# Patient Record
Sex: FEMALE | Race: WHITE | NOT HISPANIC OR LATINO
[De-identification: names, ages, dates, MRNs, and addresses within clinical notes are randomized per-mention and may not be internally consistent; named-entity substitution may affect disease eponyms.]

---

## 2018-05-22 PROBLEM — Z00.00 ENCOUNTER FOR PREVENTIVE HEALTH EXAMINATION: Status: ACTIVE | Noted: 2018-05-22

## 2018-05-31 ENCOUNTER — APPOINTMENT (OUTPATIENT)
Dept: PHYSICAL MEDICINE AND REHAB | Facility: CLINIC | Age: 38
End: 2018-05-31
Payer: COMMERCIAL

## 2018-05-31 VITALS — HEIGHT: 68 IN | BODY MASS INDEX: 20.46 KG/M2 | HEART RATE: 66 BPM | WEIGHT: 135 LBS

## 2018-05-31 PROCEDURE — 99204 OFFICE O/P NEW MOD 45 MIN: CPT

## 2018-05-31 RX ORDER — MELOXICAM 15 MG/1
15 TABLET ORAL
Qty: 30 | Refills: 1 | Status: ACTIVE | COMMUNITY
Start: 2018-05-31 | End: 1900-01-01

## 2018-05-31 RX ORDER — METAXALONE 800 MG/1
800 TABLET ORAL
Qty: 30 | Refills: 0 | Status: ACTIVE | COMMUNITY
Start: 2018-05-31 | End: 1900-01-01

## 2018-11-05 ENCOUNTER — APPOINTMENT (OUTPATIENT)
Dept: PHYSICAL MEDICINE AND REHAB | Facility: CLINIC | Age: 38
End: 2018-11-05
Payer: COMMERCIAL

## 2018-11-05 VITALS
WEIGHT: 135 LBS | RESPIRATION RATE: 16 BRPM | BODY MASS INDEX: 20.46 KG/M2 | HEIGHT: 68 IN | HEART RATE: 63 BPM | OXYGEN SATURATION: 98 %

## 2018-11-05 DIAGNOSIS — M54.5 LOW BACK PAIN: ICD-10-CM

## 2018-11-05 PROCEDURE — 99214 OFFICE O/P EST MOD 30 MIN: CPT

## 2018-11-20 ENCOUNTER — APPOINTMENT (OUTPATIENT)
Dept: PHYSICAL MEDICINE AND REHAB | Facility: CLINIC | Age: 38
End: 2018-11-20

## 2018-12-20 ENCOUNTER — APPOINTMENT (OUTPATIENT)
Dept: PHYSICAL MEDICINE AND REHAB | Facility: CLINIC | Age: 38
End: 2018-12-20

## 2019-11-12 ENCOUNTER — APPOINTMENT (OUTPATIENT)
Dept: HUMAN REPRODUCTION | Facility: CLINIC | Age: 39
End: 2019-11-12

## 2019-11-12 ENCOUNTER — APPOINTMENT (OUTPATIENT)
Dept: HUMAN REPRODUCTION | Facility: CLINIC | Age: 39
End: 2019-11-12
Payer: COMMERCIAL

## 2019-11-12 PROCEDURE — 99205 OFFICE O/P NEW HI 60 MIN: CPT | Mod: 25

## 2019-11-12 PROCEDURE — 76830 TRANSVAGINAL US NON-OB: CPT

## 2019-11-15 ENCOUNTER — OUTPATIENT (OUTPATIENT)
Dept: OUTPATIENT SERVICES | Facility: HOSPITAL | Age: 39
LOS: 1 days | End: 2019-11-15
Payer: COMMERCIAL

## 2019-11-15 ENCOUNTER — TRANSCRIPTION ENCOUNTER (OUTPATIENT)
Age: 39
End: 2019-11-15

## 2019-11-15 ENCOUNTER — APPOINTMENT (OUTPATIENT)
Dept: MRI IMAGING | Facility: CLINIC | Age: 39
End: 2019-11-15
Payer: COMMERCIAL

## 2019-11-15 DIAGNOSIS — Z00.8 ENCOUNTER FOR OTHER GENERAL EXAMINATION: ICD-10-CM

## 2019-11-15 PROCEDURE — 72197 MRI PELVIS W/O & W/DYE: CPT | Mod: 26

## 2019-11-15 PROCEDURE — A9585: CPT

## 2019-11-15 PROCEDURE — 72197 MRI PELVIS W/O & W/DYE: CPT

## 2019-12-05 ENCOUNTER — FORM ENCOUNTER (OUTPATIENT)
Age: 39
End: 2019-12-05

## 2019-12-06 ENCOUNTER — APPOINTMENT (OUTPATIENT)
Dept: OBGYN | Facility: CLINIC | Age: 39
End: 2019-12-06
Payer: COMMERCIAL

## 2019-12-06 PROCEDURE — 99204 OFFICE O/P NEW MOD 45 MIN: CPT

## 2019-12-10 ENCOUNTER — APPOINTMENT (OUTPATIENT)
Dept: HUMAN REPRODUCTION | Facility: CLINIC | Age: 39
End: 2019-12-10
Payer: COMMERCIAL

## 2019-12-10 PROCEDURE — 99214 OFFICE O/P EST MOD 30 MIN: CPT

## 2020-02-11 ENCOUNTER — OUTPATIENT (OUTPATIENT)
Dept: OUTPATIENT SERVICES | Facility: HOSPITAL | Age: 40
LOS: 1 days | End: 2020-02-11
Payer: COMMERCIAL

## 2020-02-11 VITALS
DIASTOLIC BLOOD PRESSURE: 80 MMHG | WEIGHT: 145.95 LBS | OXYGEN SATURATION: 98 % | RESPIRATION RATE: 16 BRPM | HEART RATE: 55 BPM | TEMPERATURE: 97 F | SYSTOLIC BLOOD PRESSURE: 120 MMHG | HEIGHT: 68 IN

## 2020-02-11 DIAGNOSIS — D25.9 LEIOMYOMA OF UTERUS, UNSPECIFIED: ICD-10-CM

## 2020-02-11 DIAGNOSIS — Z01.818 ENCOUNTER FOR OTHER PREPROCEDURAL EXAMINATION: ICD-10-CM

## 2020-02-11 DIAGNOSIS — Z98.890 OTHER SPECIFIED POSTPROCEDURAL STATES: Chronic | ICD-10-CM

## 2020-02-11 LAB
BLD GP AB SCN SERPL QL: NEGATIVE — SIGNIFICANT CHANGE UP
HCT VFR BLD CALC: 43.8 % — SIGNIFICANT CHANGE UP (ref 34.5–45)
HGB BLD-MCNC: 14.2 G/DL — SIGNIFICANT CHANGE UP (ref 11.5–15.5)
MCHC RBC-ENTMCNC: 28.5 PG — SIGNIFICANT CHANGE UP (ref 27–34)
MCHC RBC-ENTMCNC: 32.4 GM/DL — SIGNIFICANT CHANGE UP (ref 32–36)
MCV RBC AUTO: 88 FL — SIGNIFICANT CHANGE UP (ref 80–100)
NRBC # BLD: 0 /100 WBCS — SIGNIFICANT CHANGE UP (ref 0–0)
PLATELET # BLD AUTO: 193 K/UL — SIGNIFICANT CHANGE UP (ref 150–400)
RBC # BLD: 4.98 M/UL — SIGNIFICANT CHANGE UP (ref 3.8–5.2)
RBC # FLD: 12 % — SIGNIFICANT CHANGE UP (ref 10.3–14.5)
RH IG SCN BLD-IMP: NEGATIVE — SIGNIFICANT CHANGE UP
WBC # BLD: 4.16 K/UL — SIGNIFICANT CHANGE UP (ref 3.8–10.5)
WBC # FLD AUTO: 4.16 K/UL — SIGNIFICANT CHANGE UP (ref 3.8–10.5)

## 2020-02-11 PROCEDURE — 85027 COMPLETE CBC AUTOMATED: CPT

## 2020-02-11 PROCEDURE — 86901 BLOOD TYPING SEROLOGIC RH(D): CPT

## 2020-02-11 PROCEDURE — 86900 BLOOD TYPING SEROLOGIC ABO: CPT

## 2020-02-11 PROCEDURE — G0463: CPT

## 2020-02-11 PROCEDURE — 86850 RBC ANTIBODY SCREEN: CPT

## 2020-02-11 RX ORDER — CEFAZOLIN SODIUM 1 G
2000 VIAL (EA) INJECTION ONCE
Refills: 0 | Status: DISCONTINUED | OUTPATIENT
Start: 2020-02-21 | End: 2020-03-07

## 2020-02-11 NOTE — H&P PST ADULT - NSANTHOSAYNRD_GEN_A_CORE
No. GUY screening performed.  STOP BANG Legend: 0-2 = LOW Risk; 3-4 = INTERMEDIATE Risk; 5-8 = HIGH Risk

## 2020-02-11 NOTE — H&P PST ADULT - HISTORY OF PRESENT ILLNESS
39 year old female with hx of prolapse came in for PSt today for laparoscopic myomectomy ,possible exploratory laparotomy , Patient has hx of back pains and symptoms got worst recently , went to see gyn , evaluated and was positive for uterine fibroids. Referred to Dr Madrigal evaluated and now scheduled this procedure for treatment. 39 year old female with hx ofbenign mitral valve  prolapse came in for PSt today for laparoscopic myomectomy ,possible exploratory laparotomy , Patient has hx of back pains and symptoms got worst recently , went to see gyn , evaluated and was positive for uterine fibroids. Referred to Dr Madrigal evaluated and now scheduled this procedure for treatment.

## 2020-02-11 NOTE — H&P PST ADULT - NSICDXPROBLEM_GEN_ALL_CORE_FT
PROBLEM DIAGNOSES  Problem: Leiomyoma of uterus  Assessment and Plan: Laparoscopic myomectomy ,possible exploratory laparotomy ,CBC/type and screen  drawn sent pending result , urine pregnancy test on DOS , to stop supplements 5 days preop

## 2020-02-20 ENCOUNTER — TRANSCRIPTION ENCOUNTER (OUTPATIENT)
Age: 40
End: 2020-02-20

## 2020-02-21 ENCOUNTER — OUTPATIENT (OUTPATIENT)
Dept: OUTPATIENT SERVICES | Facility: HOSPITAL | Age: 40
LOS: 1 days | End: 2020-02-21
Payer: COMMERCIAL

## 2020-02-21 ENCOUNTER — APPOINTMENT (OUTPATIENT)
Dept: OBGYN | Facility: HOSPITAL | Age: 40
End: 2020-02-21

## 2020-02-21 ENCOUNTER — RESULT REVIEW (OUTPATIENT)
Age: 40
End: 2020-02-21

## 2020-02-21 ENCOUNTER — TRANSCRIPTION ENCOUNTER (OUTPATIENT)
Age: 40
End: 2020-02-21

## 2020-02-21 VITALS
SYSTOLIC BLOOD PRESSURE: 106 MMHG | DIASTOLIC BLOOD PRESSURE: 63 MMHG | RESPIRATION RATE: 18 BRPM | OXYGEN SATURATION: 99 % | HEART RATE: 58 BPM

## 2020-02-21 VITALS
HEART RATE: 69 BPM | HEIGHT: 68 IN | DIASTOLIC BLOOD PRESSURE: 82 MMHG | TEMPERATURE: 99 F | SYSTOLIC BLOOD PRESSURE: 143 MMHG | OXYGEN SATURATION: 100 % | RESPIRATION RATE: 18 BRPM | WEIGHT: 145.95 LBS

## 2020-02-21 DIAGNOSIS — D25.9 LEIOMYOMA OF UTERUS, UNSPECIFIED: ICD-10-CM

## 2020-02-21 DIAGNOSIS — Z98.890 OTHER SPECIFIED POSTPROCEDURAL STATES: Chronic | ICD-10-CM

## 2020-02-21 LAB
GLUCOSE BLDC GLUCOMTR-MCNC: 92 MG/DL — SIGNIFICANT CHANGE UP (ref 70–99)
HCG UR QL: NEGATIVE — SIGNIFICANT CHANGE UP
RH IG SCN BLD-IMP: NEGATIVE — SIGNIFICANT CHANGE UP

## 2020-02-21 PROCEDURE — 88305 TISSUE EXAM BY PATHOLOGIST: CPT

## 2020-02-21 PROCEDURE — C1765: CPT

## 2020-02-21 PROCEDURE — 58545 LAPAROSCOPIC MYOMECTOMY: CPT

## 2020-02-21 PROCEDURE — 81025 URINE PREGNANCY TEST: CPT

## 2020-02-21 PROCEDURE — 82962 GLUCOSE BLOOD TEST: CPT

## 2020-02-21 PROCEDURE — C1889: CPT

## 2020-02-21 PROCEDURE — 58546 LAPARO-MYOMECTOMY COMPLEX: CPT

## 2020-02-21 PROCEDURE — 86901 BLOOD TYPING SEROLOGIC RH(D): CPT

## 2020-02-21 PROCEDURE — 88305 TISSUE EXAM BY PATHOLOGIST: CPT | Mod: 26

## 2020-02-21 PROCEDURE — 86900 BLOOD TYPING SEROLOGIC ABO: CPT

## 2020-02-21 PROCEDURE — 58350 REOPEN FALLOPIAN TUBE: CPT

## 2020-02-21 PROCEDURE — 58545 LAPAROSCOPIC MYOMECTOMY: CPT | Mod: 80

## 2020-02-21 RX ORDER — SODIUM CHLORIDE 9 MG/ML
1000 INJECTION, SOLUTION INTRAVENOUS
Refills: 0 | Status: DISCONTINUED | OUTPATIENT
Start: 2020-02-21 | End: 2020-03-07

## 2020-02-21 RX ORDER — OXYCODONE HYDROCHLORIDE 5 MG/1
1 TABLET ORAL
Qty: 5 | Refills: 0
Start: 2020-02-21

## 2020-02-21 RX ORDER — CELECOXIB 200 MG/1
200 CAPSULE ORAL ONCE
Refills: 0 | Status: COMPLETED | OUTPATIENT
Start: 2020-02-21 | End: 2020-02-21

## 2020-02-21 RX ORDER — ACETAMINOPHEN 500 MG
3 TABLET ORAL
Qty: 0 | Refills: 0 | DISCHARGE

## 2020-02-21 RX ORDER — SODIUM CHLORIDE 9 MG/ML
3 INJECTION INTRAMUSCULAR; INTRAVENOUS; SUBCUTANEOUS EVERY 8 HOURS
Refills: 0 | Status: DISCONTINUED | OUTPATIENT
Start: 2020-02-21 | End: 2020-02-21

## 2020-02-21 RX ORDER — FAMOTIDINE 10 MG/ML
20 INJECTION INTRAVENOUS ONCE
Refills: 0 | Status: COMPLETED | OUTPATIENT
Start: 2020-02-21 | End: 2020-02-21

## 2020-02-21 RX ORDER — CHLORHEXIDINE GLUCONATE 213 G/1000ML
1 SOLUTION TOPICAL ONCE
Refills: 0 | Status: DISCONTINUED | OUTPATIENT
Start: 2020-02-21 | End: 2020-02-21

## 2020-02-21 RX ORDER — HYDROMORPHONE HYDROCHLORIDE 2 MG/ML
0.5 INJECTION INTRAMUSCULAR; INTRAVENOUS; SUBCUTANEOUS
Refills: 0 | Status: DISCONTINUED | OUTPATIENT
Start: 2020-02-21 | End: 2020-02-21

## 2020-02-21 RX ORDER — IBUPROFEN 200 MG
1 TABLET ORAL
Qty: 0 | Refills: 0 | DISCHARGE

## 2020-02-21 RX ORDER — OXYCODONE HYDROCHLORIDE 5 MG/1
5 TABLET ORAL ONCE
Refills: 0 | Status: DISCONTINUED | OUTPATIENT
Start: 2020-02-21 | End: 2020-02-21

## 2020-02-21 RX ORDER — ACETAMINOPHEN 500 MG
1000 TABLET ORAL ONCE
Refills: 0 | Status: DISCONTINUED | OUTPATIENT
Start: 2020-02-21 | End: 2020-02-21

## 2020-02-21 RX ORDER — LIDOCAINE HCL 20 MG/ML
0.2 VIAL (ML) INJECTION ONCE
Refills: 0 | Status: DISCONTINUED | OUTPATIENT
Start: 2020-02-21 | End: 2020-02-21

## 2020-02-21 RX ADMIN — OXYCODONE HYDROCHLORIDE 5 MILLIGRAM(S): 5 TABLET ORAL at 15:10

## 2020-02-21 RX ADMIN — HYDROMORPHONE HYDROCHLORIDE 0.5 MILLIGRAM(S): 2 INJECTION INTRAMUSCULAR; INTRAVENOUS; SUBCUTANEOUS at 12:05

## 2020-02-21 RX ADMIN — SODIUM CHLORIDE 3 MILLILITER(S): 9 INJECTION INTRAMUSCULAR; INTRAVENOUS; SUBCUTANEOUS at 06:54

## 2020-02-21 RX ADMIN — FAMOTIDINE 20 MILLIGRAM(S): 10 INJECTION INTRAVENOUS at 06:53

## 2020-02-21 RX ADMIN — OXYCODONE HYDROCHLORIDE 5 MILLIGRAM(S): 5 TABLET ORAL at 14:48

## 2020-02-21 RX ADMIN — HYDROMORPHONE HYDROCHLORIDE 0.5 MILLIGRAM(S): 2 INJECTION INTRAMUSCULAR; INTRAVENOUS; SUBCUTANEOUS at 11:48

## 2020-02-21 RX ADMIN — CELECOXIB 200 MILLIGRAM(S): 200 CAPSULE ORAL at 06:53

## 2020-02-21 NOTE — BRIEF OPERATIVE NOTE - OPERATION/FINDINGS
Enlarged fibroid uterus to 10 weeks with posterior fibroid   Normal appearing bilateral tubes and ovaries   Spillage of methylene blue from bilateral tubes   Normal appearing cervix, external female genitalia, appendix, liver, stomach edge

## 2020-02-21 NOTE — DISCHARGE NOTE NURSING/CASE MANAGEMENT/SOCIAL WORK - PATIENT PORTAL LINK FT
You can access the FollowMyHealth Patient Portal offered by [facility] by registering at the following website: [email or web address]. By joining [employer]’s FollowMyHealth portal, you will also be able to view your health information using other applications (apps) compatible with our system.

## 2020-02-21 NOTE — ASU DISCHARGE PLAN (ADULT/PEDIATRIC) - ASU DC SPECIAL INSTRUCTIONSFT
nothing in vagina (sex, tampons, douching, tub baths) no heavy lifting (>10 lbs) for 6 weeks. Please return to ER or call your doctors office if pain is worsening, you are unable to keep down food or drink, fever >100.4, heavy vaginal bleeding. You are able to take a shower regularly.

## 2020-02-21 NOTE — ASU DISCHARGE PLAN (ADULT/PEDIATRIC) - CARE PROVIDER_API CALL
Bar Madrigal)  Obstetrics and Gynecology  [address] [place] [address], [address]  [place] [address]  Phone: [phone]  Fax: [phone]  Follow Up Time: 2 weeks

## 2020-02-21 NOTE — ASU DISCHARGE PLAN (ADULT/PEDIATRIC) - CALL YOUR DOCTOR IF YOU HAVE ANY OF THE FOLLOWING:
Pain not relieved by Medications/Numbness, tingling, color or temperature change to extremity/Fever greater than (need to indicate Fahrenheit or Celsius)/Wound/Surgical Site with redness, or foul smelling discharge or pus/Nausea and vomiting that does not stop/Unable to urinate/Inability to tolerate liquids or foods

## 2020-02-21 NOTE — PROGRESS NOTE ADULT - SUBJECTIVE AND OBJECTIVE BOX
POST-OP CHECK  PA Note    Allergies    No Known Allergies    Intolerances        S: Pt awake and alert resting comfortably in chair     Pain controlled. Pt denies N/V, SOB, CP, or palpitations.  Denies passing Flatus.  Tolerating clears.  Due to void by 5 pm    O:   T(C): 36.5 (02-21-20 @ 11:15), Max: 36.5 (02-21-20 @ 11:15)  HR: 54 (02-21-20 @ 13:30) (46 - 83)  BP: 109/63 (02-21-20 @ 13:30) (106/59 - 134/67)  RR: 16 (02-21-20 @ 13:30) (16 - 16)  SpO2: 98% (02-21-20 @ 13:30) (96% - 99%)  I&O's Summary                       OR           PACU  (I)              1300                IVF LR@125  (O)             250                 Due to void by 5pm  EBL            100    CV: RRR  Lungs: CTA B/L  Abd: + hypoactive BS, soft, appropriately tender  Inc: Clean/dry/intact - 3 small incision sites covered by steri strips  Ext: SCDs in place, Neg Homans B/L    A/P: 39y Female S/P Laparoscopic myomectomy and chromotubation POD #0  with  PAST MEDICAL & SURGICAL HISTORY:  MVP (mitral valve prolapse): benign  S/P bunionectomy: 2017 / bilateral      -Neuro - AAO x 3, Pain well controlled  -Heme - clinically hemodynamically stable  -CV - asymptomatic  -Pulm - encourage IS, O2sat 98% on RA  -GI - Diet-  Regular  as Tolerated  - -  Due to void by 5 pm  -DVT prophylaxis - SCDs in place, encourage ambulation  -Meds:  ceFAZolin   IVPB 2000 milliGRAM(s) IV Intermittent once  HYDROmorphone  Injectable 0.5 milliGRAM(s) IV Push every 10 minutes PRN  lactated ringers. 1000 milliLiter(s) IV Continuous <Continuous>    -Dispo: stable for discharge from PACU, once meeting all PACU milestones and voiding without difficulty    Treasure Hanson PA-C

## 2020-02-21 NOTE — BRIEF OPERATIVE NOTE - NSICDXBRIEFPROCEDURE_GEN_ALL_CORE_FT
PROCEDURES:  Chromopertubation of fallopian tube 21-Feb-2020 10:57:54  Jo Ann Guerrero  Myomectomy, laparoscopic, 1-4 myomas 21-Feb-2020 10:57:39  Jo Ann Guerrero

## 2020-02-26 ENCOUNTER — FORM ENCOUNTER (OUTPATIENT)
Age: 40
End: 2020-02-26

## 2020-02-27 LAB — SURGICAL PATHOLOGY STUDY: SIGNIFICANT CHANGE UP

## 2020-03-06 ENCOUNTER — APPOINTMENT (OUTPATIENT)
Dept: OBGYN | Facility: CLINIC | Age: 40
End: 2020-03-06

## 2020-03-06 PROBLEM — I34.1 NONRHEUMATIC MITRAL (VALVE) PROLAPSE: Chronic | Status: ACTIVE | Noted: 2020-02-11

## 2020-03-09 ENCOUNTER — FORM ENCOUNTER (OUTPATIENT)
Age: 40
End: 2020-03-09

## 2020-04-02 ENCOUNTER — FORM ENCOUNTER (OUTPATIENT)
Age: 40
End: 2020-04-02

## 2020-04-03 ENCOUNTER — APPOINTMENT (OUTPATIENT)
Dept: OBGYN | Facility: CLINIC | Age: 40
End: 2020-04-03

## 2020-07-17 ENCOUNTER — APPOINTMENT (OUTPATIENT)
Dept: HUMAN REPRODUCTION | Facility: CLINIC | Age: 40
End: 2020-07-17
Payer: COMMERCIAL

## 2020-07-17 PROCEDURE — 99215 OFFICE O/P EST HI 40 MIN: CPT | Mod: 95

## 2020-07-24 ENCOUNTER — APPOINTMENT (OUTPATIENT)
Dept: HUMAN REPRODUCTION | Facility: CLINIC | Age: 40
End: 2020-07-24
Payer: COMMERCIAL

## 2020-07-24 PROCEDURE — 99213 OFFICE O/P EST LOW 20 MIN: CPT | Mod: 25

## 2020-07-24 PROCEDURE — 76830 TRANSVAGINAL US NON-OB: CPT

## 2020-07-24 PROCEDURE — 36415 COLL VENOUS BLD VENIPUNCTURE: CPT

## 2020-07-31 ENCOUNTER — APPOINTMENT (OUTPATIENT)
Dept: HUMAN REPRODUCTION | Facility: CLINIC | Age: 40
End: 2020-07-31
Payer: COMMERCIAL

## 2020-07-31 PROCEDURE — 36415 COLL VENOUS BLD VENIPUNCTURE: CPT

## 2020-07-31 PROCEDURE — 76830 TRANSVAGINAL US NON-OB: CPT

## 2020-07-31 PROCEDURE — 99213 OFFICE O/P EST LOW 20 MIN: CPT | Mod: 25

## 2020-08-01 ENCOUNTER — APPOINTMENT (OUTPATIENT)
Dept: HUMAN REPRODUCTION | Facility: CLINIC | Age: 40
End: 2020-08-01
Payer: COMMERCIAL

## 2020-08-01 PROCEDURE — 99213 OFFICE O/P EST LOW 20 MIN: CPT | Mod: 25

## 2020-08-01 PROCEDURE — 89261 SPERM ISOLATION COMPLEX: CPT

## 2020-08-01 PROCEDURE — 58322 ARTIFICIAL INSEMINATION: CPT

## 2020-08-18 ENCOUNTER — APPOINTMENT (OUTPATIENT)
Dept: HUMAN REPRODUCTION | Facility: CLINIC | Age: 40
End: 2020-08-18
Payer: COMMERCIAL

## 2020-08-18 PROCEDURE — 76830 TRANSVAGINAL US NON-OB: CPT

## 2020-08-18 PROCEDURE — 36415 COLL VENOUS BLD VENIPUNCTURE: CPT

## 2020-08-18 PROCEDURE — 99213 OFFICE O/P EST LOW 20 MIN: CPT | Mod: 25

## 2020-08-25 ENCOUNTER — APPOINTMENT (OUTPATIENT)
Dept: HUMAN REPRODUCTION | Facility: CLINIC | Age: 40
End: 2020-08-25
Payer: COMMERCIAL

## 2020-08-25 PROCEDURE — 99213 OFFICE O/P EST LOW 20 MIN: CPT | Mod: 25

## 2020-08-25 PROCEDURE — 76830 TRANSVAGINAL US NON-OB: CPT

## 2020-08-28 ENCOUNTER — APPOINTMENT (OUTPATIENT)
Dept: HUMAN REPRODUCTION | Facility: CLINIC | Age: 40
End: 2020-08-28
Payer: COMMERCIAL

## 2020-08-28 PROCEDURE — 99213 OFFICE O/P EST LOW 20 MIN: CPT | Mod: 25

## 2020-08-28 PROCEDURE — 89353 THAWING CRYOPRESRVED SPERM: CPT

## 2020-08-28 PROCEDURE — 89260 SPERM ISOLATION SIMPLE: CPT

## 2020-08-28 PROCEDURE — 58322 ARTIFICIAL INSEMINATION: CPT

## 2020-09-10 ENCOUNTER — FORM ENCOUNTER (OUTPATIENT)
Age: 40
End: 2020-09-10

## 2020-09-14 ENCOUNTER — APPOINTMENT (OUTPATIENT)
Dept: HUMAN REPRODUCTION | Facility: CLINIC | Age: 40
End: 2020-09-14
Payer: COMMERCIAL

## 2020-09-14 PROCEDURE — 36415 COLL VENOUS BLD VENIPUNCTURE: CPT

## 2020-09-14 PROCEDURE — 99213 OFFICE O/P EST LOW 20 MIN: CPT | Mod: 25

## 2020-09-14 PROCEDURE — 76830 TRANSVAGINAL US NON-OB: CPT

## 2020-09-21 ENCOUNTER — APPOINTMENT (OUTPATIENT)
Dept: HUMAN REPRODUCTION | Facility: CLINIC | Age: 40
End: 2020-09-21
Payer: COMMERCIAL

## 2020-09-21 PROCEDURE — 36415 COLL VENOUS BLD VENIPUNCTURE: CPT

## 2020-09-21 PROCEDURE — 99213 OFFICE O/P EST LOW 20 MIN: CPT | Mod: 25

## 2020-09-21 PROCEDURE — 76830 TRANSVAGINAL US NON-OB: CPT

## 2020-09-22 ENCOUNTER — APPOINTMENT (OUTPATIENT)
Dept: HUMAN REPRODUCTION | Facility: CLINIC | Age: 40
End: 2020-09-22
Payer: COMMERCIAL

## 2020-09-22 PROCEDURE — 58322 ARTIFICIAL INSEMINATION: CPT

## 2020-09-22 PROCEDURE — 99213 OFFICE O/P EST LOW 20 MIN: CPT | Mod: 25

## 2020-09-22 PROCEDURE — 89261 SPERM ISOLATION COMPLEX: CPT

## 2020-10-09 ENCOUNTER — APPOINTMENT (OUTPATIENT)
Dept: HUMAN REPRODUCTION | Facility: CLINIC | Age: 40
End: 2020-10-09
Payer: COMMERCIAL

## 2020-10-09 PROCEDURE — 99213 OFFICE O/P EST LOW 20 MIN: CPT | Mod: 25

## 2020-10-09 PROCEDURE — 36415 COLL VENOUS BLD VENIPUNCTURE: CPT

## 2020-10-09 PROCEDURE — 76830 TRANSVAGINAL US NON-OB: CPT

## 2020-10-16 ENCOUNTER — APPOINTMENT (OUTPATIENT)
Dept: HUMAN REPRODUCTION | Facility: CLINIC | Age: 40
End: 2020-10-16
Payer: COMMERCIAL

## 2020-10-16 PROCEDURE — 99213 OFFICE O/P EST LOW 20 MIN: CPT | Mod: 25

## 2020-10-16 PROCEDURE — 76830 TRANSVAGINAL US NON-OB: CPT

## 2020-10-16 PROCEDURE — 36415 COLL VENOUS BLD VENIPUNCTURE: CPT

## 2020-10-18 ENCOUNTER — APPOINTMENT (OUTPATIENT)
Dept: HUMAN REPRODUCTION | Facility: CLINIC | Age: 40
End: 2020-10-18
Payer: COMMERCIAL

## 2020-10-18 PROCEDURE — 89353 THAWING CRYOPRESRVED SPERM: CPT

## 2020-10-18 PROCEDURE — 58322 ARTIFICIAL INSEMINATION: CPT

## 2020-10-18 PROCEDURE — 99213 OFFICE O/P EST LOW 20 MIN: CPT | Mod: 25

## 2020-10-18 PROCEDURE — 89261 SPERM ISOLATION COMPLEX: CPT

## 2020-10-23 ENCOUNTER — APPOINTMENT (OUTPATIENT)
Dept: HUMAN REPRODUCTION | Facility: CLINIC | Age: 40
End: 2020-10-23

## 2020-11-03 ENCOUNTER — APPOINTMENT (OUTPATIENT)
Dept: HUMAN REPRODUCTION | Facility: CLINIC | Age: 40
End: 2020-11-03
Payer: COMMERCIAL

## 2020-11-03 PROCEDURE — 76830 TRANSVAGINAL US NON-OB: CPT

## 2020-11-03 PROCEDURE — 36415 COLL VENOUS BLD VENIPUNCTURE: CPT

## 2020-11-03 PROCEDURE — 99072 ADDL SUPL MATRL&STAF TM PHE: CPT

## 2020-11-03 PROCEDURE — 99213 OFFICE O/P EST LOW 20 MIN: CPT | Mod: 25

## 2020-11-10 ENCOUNTER — APPOINTMENT (OUTPATIENT)
Dept: HUMAN REPRODUCTION | Facility: CLINIC | Age: 40
End: 2020-11-10
Payer: COMMERCIAL

## 2020-11-10 PROCEDURE — 76830 TRANSVAGINAL US NON-OB: CPT

## 2020-11-10 PROCEDURE — 36415 COLL VENOUS BLD VENIPUNCTURE: CPT

## 2020-11-10 PROCEDURE — 99072 ADDL SUPL MATRL&STAF TM PHE: CPT

## 2020-11-10 PROCEDURE — 99213 OFFICE O/P EST LOW 20 MIN: CPT | Mod: 25

## 2020-11-12 ENCOUNTER — APPOINTMENT (OUTPATIENT)
Dept: HUMAN REPRODUCTION | Facility: CLINIC | Age: 40
End: 2020-11-12
Payer: COMMERCIAL

## 2020-11-12 PROCEDURE — 99213 OFFICE O/P EST LOW 20 MIN: CPT | Mod: 25

## 2020-11-12 PROCEDURE — 58322 ARTIFICIAL INSEMINATION: CPT

## 2020-11-12 PROCEDURE — 89353 THAWING CRYOPRESRVED SPERM: CPT

## 2020-11-12 PROCEDURE — 99072 ADDL SUPL MATRL&STAF TM PHE: CPT

## 2020-11-25 ENCOUNTER — APPOINTMENT (OUTPATIENT)
Dept: HUMAN REPRODUCTION | Facility: CLINIC | Age: 40
End: 2020-11-25
Payer: COMMERCIAL

## 2020-11-25 PROCEDURE — 99213 OFFICE O/P EST LOW 20 MIN: CPT | Mod: 25

## 2020-11-25 PROCEDURE — 36415 COLL VENOUS BLD VENIPUNCTURE: CPT

## 2020-11-25 PROCEDURE — 76830 TRANSVAGINAL US NON-OB: CPT

## 2020-11-30 ENCOUNTER — APPOINTMENT (OUTPATIENT)
Dept: HUMAN REPRODUCTION | Facility: CLINIC | Age: 40
End: 2020-11-30
Payer: COMMERCIAL

## 2020-11-30 PROCEDURE — 99213 OFFICE O/P EST LOW 20 MIN: CPT | Mod: 25

## 2020-11-30 PROCEDURE — 36415 COLL VENOUS BLD VENIPUNCTURE: CPT

## 2020-11-30 PROCEDURE — 76830 TRANSVAGINAL US NON-OB: CPT

## 2020-12-07 ENCOUNTER — APPOINTMENT (OUTPATIENT)
Dept: HUMAN REPRODUCTION | Facility: CLINIC | Age: 40
End: 2020-12-07
Payer: COMMERCIAL

## 2020-12-07 PROCEDURE — 76830 TRANSVAGINAL US NON-OB: CPT

## 2020-12-07 PROCEDURE — 36415 COLL VENOUS BLD VENIPUNCTURE: CPT

## 2020-12-07 PROCEDURE — 99213 OFFICE O/P EST LOW 20 MIN: CPT | Mod: 25

## 2020-12-07 PROCEDURE — 99072 ADDL SUPL MATRL&STAF TM PHE: CPT

## 2020-12-08 ENCOUNTER — APPOINTMENT (OUTPATIENT)
Dept: HUMAN REPRODUCTION | Facility: CLINIC | Age: 40
End: 2020-12-08
Payer: COMMERCIAL

## 2020-12-08 PROCEDURE — 99072 ADDL SUPL MATRL&STAF TM PHE: CPT

## 2020-12-08 PROCEDURE — 89353 THAWING CRYOPRESRVED SPERM: CPT

## 2020-12-08 PROCEDURE — 99213 OFFICE O/P EST LOW 20 MIN: CPT | Mod: 25

## 2020-12-08 PROCEDURE — 58322 ARTIFICIAL INSEMINATION: CPT

## 2020-12-17 ENCOUNTER — APPOINTMENT (OUTPATIENT)
Dept: HUMAN REPRODUCTION | Facility: CLINIC | Age: 40
End: 2020-12-17

## 2020-12-21 ENCOUNTER — APPOINTMENT (OUTPATIENT)
Dept: HUMAN REPRODUCTION | Facility: CLINIC | Age: 40
End: 2020-12-21
Payer: COMMERCIAL

## 2020-12-21 PROCEDURE — 99213 OFFICE O/P EST LOW 20 MIN: CPT | Mod: 25

## 2020-12-21 PROCEDURE — 36415 COLL VENOUS BLD VENIPUNCTURE: CPT

## 2020-12-21 PROCEDURE — 76830 TRANSVAGINAL US NON-OB: CPT

## 2020-12-21 PROCEDURE — 99072 ADDL SUPL MATRL&STAF TM PHE: CPT

## 2020-12-23 ENCOUNTER — APPOINTMENT (OUTPATIENT)
Dept: HUMAN REPRODUCTION | Facility: CLINIC | Age: 40
End: 2020-12-23
Payer: COMMERCIAL

## 2020-12-23 PROCEDURE — 36415 COLL VENOUS BLD VENIPUNCTURE: CPT

## 2020-12-23 PROCEDURE — 99072 ADDL SUPL MATRL&STAF TM PHE: CPT

## 2020-12-29 ENCOUNTER — APPOINTMENT (OUTPATIENT)
Dept: HUMAN REPRODUCTION | Facility: CLINIC | Age: 40
End: 2020-12-29

## 2020-12-30 ENCOUNTER — APPOINTMENT (OUTPATIENT)
Dept: HUMAN REPRODUCTION | Facility: CLINIC | Age: 40
End: 2020-12-30
Payer: COMMERCIAL

## 2020-12-30 PROCEDURE — 99213 OFFICE O/P EST LOW 20 MIN: CPT | Mod: 25

## 2020-12-30 PROCEDURE — 99072 ADDL SUPL MATRL&STAF TM PHE: CPT

## 2020-12-30 PROCEDURE — 76830 TRANSVAGINAL US NON-OB: CPT

## 2020-12-30 PROCEDURE — 36415 COLL VENOUS BLD VENIPUNCTURE: CPT

## 2021-01-05 ENCOUNTER — APPOINTMENT (OUTPATIENT)
Dept: HUMAN REPRODUCTION | Facility: CLINIC | Age: 41
End: 2021-01-05
Payer: COMMERCIAL

## 2021-01-05 PROCEDURE — 99213 OFFICE O/P EST LOW 20 MIN: CPT | Mod: 25

## 2021-01-05 PROCEDURE — 76830 TRANSVAGINAL US NON-OB: CPT

## 2021-01-05 PROCEDURE — 36415 COLL VENOUS BLD VENIPUNCTURE: CPT

## 2021-01-05 PROCEDURE — 99072 ADDL SUPL MATRL&STAF TM PHE: CPT

## 2021-01-12 ENCOUNTER — APPOINTMENT (OUTPATIENT)
Dept: HUMAN REPRODUCTION | Facility: CLINIC | Age: 41
End: 2021-01-12
Payer: COMMERCIAL

## 2021-01-12 ENCOUNTER — FORM ENCOUNTER (OUTPATIENT)
Age: 41
End: 2021-01-12

## 2021-01-12 PROCEDURE — 99072 ADDL SUPL MATRL&STAF TM PHE: CPT

## 2021-01-12 PROCEDURE — 76817 TRANSVAGINAL US OBSTETRIC: CPT

## 2021-01-12 PROCEDURE — 36415 COLL VENOUS BLD VENIPUNCTURE: CPT

## 2021-01-12 PROCEDURE — 99213 OFFICE O/P EST LOW 20 MIN: CPT | Mod: 25

## 2021-01-18 ENCOUNTER — FORM ENCOUNTER (OUTPATIENT)
Age: 41
End: 2021-01-18

## 2021-01-19 ENCOUNTER — APPOINTMENT (OUTPATIENT)
Dept: OBGYN | Facility: CLINIC | Age: 41
End: 2021-01-19
Payer: COMMERCIAL

## 2021-01-19 PROCEDURE — 76817 TRANSVAGINAL US OBSTETRIC: CPT

## 2021-01-19 PROCEDURE — 99072 ADDL SUPL MATRL&STAF TM PHE: CPT

## 2021-01-19 PROCEDURE — 99213 OFFICE O/P EST LOW 20 MIN: CPT | Mod: 25

## 2021-01-20 ENCOUNTER — FORM ENCOUNTER (OUTPATIENT)
Age: 41
End: 2021-01-20

## 2021-01-20 ENCOUNTER — APPOINTMENT (OUTPATIENT)
Dept: OBGYN | Facility: CLINIC | Age: 41
End: 2021-01-20

## 2021-08-13 ENCOUNTER — APPOINTMENT (OUTPATIENT)
Dept: HUMAN REPRODUCTION | Facility: CLINIC | Age: 41
End: 2021-08-13
Payer: COMMERCIAL

## 2021-08-13 PROCEDURE — 99215 OFFICE O/P EST HI 40 MIN: CPT | Mod: 95

## 2021-08-31 ENCOUNTER — APPOINTMENT (OUTPATIENT)
Dept: HUMAN REPRODUCTION | Facility: CLINIC | Age: 41
End: 2021-08-31
Payer: COMMERCIAL

## 2021-08-31 PROCEDURE — 36415 COLL VENOUS BLD VENIPUNCTURE: CPT

## 2021-09-02 ENCOUNTER — APPOINTMENT (OUTPATIENT)
Dept: HUMAN REPRODUCTION | Facility: CLINIC | Age: 41
End: 2021-09-02
Payer: COMMERCIAL

## 2021-09-02 PROCEDURE — 99072 ADDL SUPL MATRL&STAF TM PHE: CPT

## 2021-09-02 PROCEDURE — 58340 CATHETER FOR HYSTEROGRAPHY: CPT

## 2021-09-02 PROCEDURE — 76831 ECHO EXAM UTERUS: CPT

## 2021-09-02 PROCEDURE — 58999I: CUSTOM

## 2021-09-10 ENCOUNTER — APPOINTMENT (OUTPATIENT)
Dept: HUMAN REPRODUCTION | Facility: CLINIC | Age: 41
End: 2021-09-10
Payer: COMMERCIAL

## 2021-09-10 PROCEDURE — 76830 TRANSVAGINAL US NON-OB: CPT

## 2021-09-10 PROCEDURE — 36415 COLL VENOUS BLD VENIPUNCTURE: CPT

## 2021-09-10 PROCEDURE — 99213 OFFICE O/P EST LOW 20 MIN: CPT | Mod: 25

## 2021-09-17 ENCOUNTER — APPOINTMENT (OUTPATIENT)
Dept: HUMAN REPRODUCTION | Facility: CLINIC | Age: 41
End: 2021-09-17
Payer: COMMERCIAL

## 2021-09-17 PROCEDURE — 99213 OFFICE O/P EST LOW 20 MIN: CPT | Mod: 25

## 2021-09-17 PROCEDURE — 36415 COLL VENOUS BLD VENIPUNCTURE: CPT

## 2021-09-17 PROCEDURE — 76830 TRANSVAGINAL US NON-OB: CPT

## 2021-09-20 ENCOUNTER — APPOINTMENT (OUTPATIENT)
Dept: HUMAN REPRODUCTION | Facility: CLINIC | Age: 41
End: 2021-09-20
Payer: COMMERCIAL

## 2021-09-20 PROCEDURE — 76830 TRANSVAGINAL US NON-OB: CPT

## 2021-09-20 PROCEDURE — 36415 COLL VENOUS BLD VENIPUNCTURE: CPT

## 2021-09-20 PROCEDURE — 99213 OFFICE O/P EST LOW 20 MIN: CPT | Mod: 25

## 2021-09-23 ENCOUNTER — APPOINTMENT (OUTPATIENT)
Dept: HUMAN REPRODUCTION | Facility: CLINIC | Age: 41
End: 2021-09-23
Payer: COMMERCIAL

## 2021-09-23 PROCEDURE — 99213 OFFICE O/P EST LOW 20 MIN: CPT | Mod: 25

## 2021-09-23 PROCEDURE — 76830 TRANSVAGINAL US NON-OB: CPT

## 2021-09-23 PROCEDURE — 36415 COLL VENOUS BLD VENIPUNCTURE: CPT

## 2021-09-26 ENCOUNTER — APPOINTMENT (OUTPATIENT)
Dept: HUMAN REPRODUCTION | Facility: CLINIC | Age: 41
End: 2021-09-26
Payer: COMMERCIAL

## 2021-09-26 PROCEDURE — 76830 TRANSVAGINAL US NON-OB: CPT

## 2021-09-26 PROCEDURE — 36415 COLL VENOUS BLD VENIPUNCTURE: CPT

## 2021-09-26 PROCEDURE — 99213 OFFICE O/P EST LOW 20 MIN: CPT | Mod: 25

## 2021-09-28 ENCOUNTER — APPOINTMENT (OUTPATIENT)
Dept: HUMAN REPRODUCTION | Facility: CLINIC | Age: 41
End: 2021-09-28
Payer: COMMERCIAL

## 2021-09-28 PROCEDURE — 36415 COLL VENOUS BLD VENIPUNCTURE: CPT

## 2021-09-28 PROCEDURE — 99213 OFFICE O/P EST LOW 20 MIN: CPT | Mod: 25

## 2021-09-28 PROCEDURE — 76830 TRANSVAGINAL US NON-OB: CPT

## 2021-09-29 ENCOUNTER — APPOINTMENT (OUTPATIENT)
Dept: HUMAN REPRODUCTION | Facility: CLINIC | Age: 41
End: 2021-09-29
Payer: COMMERCIAL

## 2021-09-29 PROCEDURE — XXXXX: CPT

## 2021-09-30 ENCOUNTER — APPOINTMENT (OUTPATIENT)
Dept: HUMAN REPRODUCTION | Facility: CLINIC | Age: 41
End: 2021-09-30
Payer: COMMERCIAL

## 2021-09-30 PROCEDURE — 36415 COLL VENOUS BLD VENIPUNCTURE: CPT

## 2021-10-01 ENCOUNTER — APPOINTMENT (OUTPATIENT)
Dept: HUMAN REPRODUCTION | Facility: CLINIC | Age: 41
End: 2021-10-01
Payer: COMMERCIAL

## 2021-10-01 PROCEDURE — 89261 SPERM ISOLATION COMPLEX: CPT

## 2021-10-01 PROCEDURE — 89250 CULTR OOCYTE/EMBRYO <4 DAYS: CPT

## 2021-10-01 PROCEDURE — 58970 RETRIEVAL OF OOCYTE: CPT

## 2021-10-01 PROCEDURE — 76948 ECHO GUIDE OVA ASPIRATION: CPT

## 2021-10-01 PROCEDURE — 89280 ASSIST OOCYTE FERTILIZATION: CPT

## 2021-10-01 PROCEDURE — 89254 OOCYTE IDENTIFICATION: CPT

## 2021-10-06 PROCEDURE — 89253 EMBRYO HATCHING: CPT

## 2021-10-06 PROCEDURE — 89272 EXTENDED CULTURE OF OOCYTES: CPT

## 2021-10-07 ENCOUNTER — APPOINTMENT (OUTPATIENT)
Dept: HUMAN REPRODUCTION | Facility: CLINIC | Age: 41
End: 2021-10-07
Payer: COMMERCIAL

## 2021-10-07 PROCEDURE — 76830 TRANSVAGINAL US NON-OB: CPT

## 2021-10-07 PROCEDURE — 99213 OFFICE O/P EST LOW 20 MIN: CPT | Mod: 25

## 2021-10-15 ENCOUNTER — APPOINTMENT (OUTPATIENT)
Dept: HUMAN REPRODUCTION | Facility: CLINIC | Age: 41
End: 2021-10-15
Payer: COMMERCIAL

## 2021-10-15 PROCEDURE — 99213 OFFICE O/P EST LOW 20 MIN: CPT | Mod: 25

## 2021-10-15 PROCEDURE — 76830 TRANSVAGINAL US NON-OB: CPT

## 2021-10-22 ENCOUNTER — APPOINTMENT (OUTPATIENT)
Dept: HUMAN REPRODUCTION | Facility: CLINIC | Age: 41
End: 2021-10-22
Payer: COMMERCIAL

## 2021-10-22 PROCEDURE — 76830 TRANSVAGINAL US NON-OB: CPT

## 2021-10-22 PROCEDURE — 99213 OFFICE O/P EST LOW 20 MIN: CPT | Mod: 25

## 2021-10-24 ENCOUNTER — APPOINTMENT (OUTPATIENT)
Dept: HUMAN REPRODUCTION | Facility: CLINIC | Age: 41
End: 2021-10-24
Payer: COMMERCIAL

## 2021-10-24 PROCEDURE — 76830 TRANSVAGINAL US NON-OB: CPT

## 2021-10-24 PROCEDURE — 99213 OFFICE O/P EST LOW 20 MIN: CPT | Mod: 25

## 2021-10-24 PROCEDURE — 36415 COLL VENOUS BLD VENIPUNCTURE: CPT

## 2021-10-25 ENCOUNTER — APPOINTMENT (OUTPATIENT)
Dept: HUMAN REPRODUCTION | Facility: CLINIC | Age: 41
End: 2021-10-25
Payer: COMMERCIAL

## 2021-10-25 PROCEDURE — 99213 OFFICE O/P EST LOW 20 MIN: CPT | Mod: 25

## 2021-10-25 PROCEDURE — 36415 COLL VENOUS BLD VENIPUNCTURE: CPT

## 2021-10-25 PROCEDURE — 76830 TRANSVAGINAL US NON-OB: CPT

## 2021-11-02 ENCOUNTER — APPOINTMENT (OUTPATIENT)
Dept: HUMAN REPRODUCTION | Facility: CLINIC | Age: 41
End: 2021-11-02
Payer: COMMERCIAL

## 2021-11-02 PROCEDURE — 99213 OFFICE O/P EST LOW 20 MIN: CPT | Mod: 25

## 2021-11-02 PROCEDURE — 36415 COLL VENOUS BLD VENIPUNCTURE: CPT

## 2021-11-02 PROCEDURE — 76830 TRANSVAGINAL US NON-OB: CPT

## 2021-11-11 ENCOUNTER — APPOINTMENT (OUTPATIENT)
Dept: HUMAN REPRODUCTION | Facility: CLINIC | Age: 41
End: 2021-11-11
Payer: COMMERCIAL

## 2021-11-11 PROCEDURE — 76830 TRANSVAGINAL US NON-OB: CPT

## 2021-11-11 PROCEDURE — 36415 COLL VENOUS BLD VENIPUNCTURE: CPT

## 2021-11-11 PROCEDURE — 99213 OFFICE O/P EST LOW 20 MIN: CPT | Mod: 25

## 2021-11-16 ENCOUNTER — APPOINTMENT (OUTPATIENT)
Dept: HUMAN REPRODUCTION | Facility: CLINIC | Age: 41
End: 2021-11-16
Payer: COMMERCIAL

## 2021-11-16 PROCEDURE — 36415 COLL VENOUS BLD VENIPUNCTURE: CPT

## 2021-11-16 PROCEDURE — 76830 TRANSVAGINAL US NON-OB: CPT

## 2021-11-16 PROCEDURE — 99213 OFFICE O/P EST LOW 20 MIN: CPT | Mod: 25

## 2021-11-19 ENCOUNTER — APPOINTMENT (OUTPATIENT)
Dept: HUMAN REPRODUCTION | Facility: CLINIC | Age: 41
End: 2021-11-19
Payer: COMMERCIAL

## 2021-11-19 PROCEDURE — 36415 COLL VENOUS BLD VENIPUNCTURE: CPT

## 2021-11-19 PROCEDURE — 76830 TRANSVAGINAL US NON-OB: CPT

## 2021-11-19 PROCEDURE — 99213 OFFICE O/P EST LOW 20 MIN: CPT | Mod: 25

## 2021-11-20 ENCOUNTER — APPOINTMENT (OUTPATIENT)
Dept: HUMAN REPRODUCTION | Facility: CLINIC | Age: 41
End: 2021-11-20
Payer: COMMERCIAL

## 2021-11-20 PROCEDURE — 36415 COLL VENOUS BLD VENIPUNCTURE: CPT

## 2021-11-20 PROCEDURE — 76830 TRANSVAGINAL US NON-OB: CPT

## 2021-11-20 PROCEDURE — 99213Y: CUSTOM | Mod: 25

## 2021-11-21 ENCOUNTER — APPOINTMENT (OUTPATIENT)
Dept: HUMAN REPRODUCTION | Facility: CLINIC | Age: 41
End: 2021-11-21
Payer: COMMERCIAL

## 2021-11-21 PROCEDURE — 36415 COLL VENOUS BLD VENIPUNCTURE: CPT

## 2021-11-21 PROCEDURE — 99213 OFFICE O/P EST LOW 20 MIN: CPT | Mod: 25

## 2021-11-21 PROCEDURE — 76830 TRANSVAGINAL US NON-OB: CPT

## 2021-11-22 ENCOUNTER — APPOINTMENT (OUTPATIENT)
Dept: HUMAN REPRODUCTION | Facility: CLINIC | Age: 41
End: 2021-11-22
Payer: COMMERCIAL

## 2021-11-22 PROCEDURE — 36415 COLL VENOUS BLD VENIPUNCTURE: CPT

## 2021-11-23 ENCOUNTER — APPOINTMENT (OUTPATIENT)
Dept: HUMAN REPRODUCTION | Facility: CLINIC | Age: 41
End: 2021-11-23
Payer: COMMERCIAL

## 2021-11-23 PROCEDURE — 99072 ADDL SUPL MATRL&STAF TM PHE: CPT

## 2021-11-23 PROCEDURE — 89280 ASSIST OOCYTE FERTILIZATION: CPT

## 2021-11-23 PROCEDURE — 89254 OOCYTE IDENTIFICATION: CPT

## 2021-11-23 PROCEDURE — 89250 CULTR OOCYTE/EMBRYO <4 DAYS: CPT

## 2021-11-23 PROCEDURE — 89260 SPERM ISOLATION SIMPLE: CPT

## 2021-11-23 PROCEDURE — 58970 RETRIEVAL OF OOCYTE: CPT

## 2021-11-23 PROCEDURE — 76948 ECHO GUIDE OVA ASPIRATION: CPT

## 2021-11-27 PROCEDURE — 89253 EMBRYO HATCHING: CPT

## 2021-11-28 PROCEDURE — 89272 EXTENDED CULTURE OF OOCYTES: CPT

## 2021-11-29 PROCEDURE — 89290 BIOPSY OOCYTE POLAR BODY <=5: CPT

## 2021-11-29 PROCEDURE — 89258 CRYOPRESERVATION EMBRYO(S): CPT

## 2021-11-29 PROCEDURE — 89342 STORAGE/YEAR EMBRYO(S): CPT | Mod: NC

## 2021-12-16 ENCOUNTER — APPOINTMENT (OUTPATIENT)
Dept: HUMAN REPRODUCTION | Facility: CLINIC | Age: 41
End: 2021-12-16

## 2021-12-20 ENCOUNTER — APPOINTMENT (OUTPATIENT)
Dept: HUMAN REPRODUCTION | Facility: CLINIC | Age: 41
End: 2021-12-20
Payer: COMMERCIAL

## 2021-12-20 PROCEDURE — 99215 OFFICE O/P EST HI 40 MIN: CPT | Mod: 95

## 2021-12-24 ENCOUNTER — TRANSCRIPTION ENCOUNTER (OUTPATIENT)
Age: 41
End: 2021-12-24

## 2022-01-03 ENCOUNTER — APPOINTMENT (OUTPATIENT)
Dept: HUMAN REPRODUCTION | Facility: CLINIC | Age: 42
End: 2022-01-03

## 2022-01-10 ENCOUNTER — APPOINTMENT (OUTPATIENT)
Dept: HUMAN REPRODUCTION | Facility: CLINIC | Age: 42
End: 2022-01-10
Payer: COMMERCIAL

## 2022-01-10 PROCEDURE — 36415 COLL VENOUS BLD VENIPUNCTURE: CPT

## 2022-01-10 PROCEDURE — 76830 TRANSVAGINAL US NON-OB: CPT

## 2022-01-10 PROCEDURE — 99213 OFFICE O/P EST LOW 20 MIN: CPT | Mod: 25

## 2022-01-12 ENCOUNTER — APPOINTMENT (OUTPATIENT)
Dept: HUMAN REPRODUCTION | Facility: CLINIC | Age: 42
End: 2022-01-12
Payer: COMMERCIAL

## 2022-01-12 PROCEDURE — 99213 OFFICE O/P EST LOW 20 MIN: CPT | Mod: 25

## 2022-01-12 PROCEDURE — 76830 TRANSVAGINAL US NON-OB: CPT

## 2022-01-13 ENCOUNTER — APPOINTMENT (OUTPATIENT)
Dept: HUMAN REPRODUCTION | Facility: CLINIC | Age: 42
End: 2022-01-13
Payer: COMMERCIAL

## 2022-01-13 PROCEDURE — 99213 OFFICE O/P EST LOW 20 MIN: CPT | Mod: 25

## 2022-01-13 PROCEDURE — 58322 ARTIFICIAL INSEMINATION: CPT

## 2022-01-13 PROCEDURE — 89353 THAWING CRYOPRESRVED SPERM: CPT

## 2022-01-13 NOTE — DISCHARGE NOTE NURSING/CASE MANAGEMENT/SOCIAL WORK - NSDCPETBCESMAN_GEN_ALL_CORE
RX REQUEST:  Rx Refill Note  Requested Prescriptions     Pending Prescriptions Disp Refills   • venlafaxine XR (Effexor XR) 75 MG 24 hr capsule 30 capsule 2     Sig: Take 1 capsule by mouth Daily.      Last office visit with prescribing clinician: 11/19/2021      Next office visit with prescribing clinician: 1/21/2022            Kristine Rodrigez CMA  01/13/22, 15:43 EST  
If you are a smoker, it is important for your health to stop smoking. Please be aware that second hand smoke is also harmful.

## 2022-01-28 ENCOUNTER — APPOINTMENT (OUTPATIENT)
Dept: HUMAN REPRODUCTION | Facility: CLINIC | Age: 42
End: 2022-01-28

## 2022-02-02 ENCOUNTER — APPOINTMENT (OUTPATIENT)
Dept: HUMAN REPRODUCTION | Facility: CLINIC | Age: 42
End: 2022-02-02
Payer: COMMERCIAL

## 2022-02-02 PROCEDURE — 36415 COLL VENOUS BLD VENIPUNCTURE: CPT

## 2022-02-02 PROCEDURE — 76830 TRANSVAGINAL US NON-OB: CPT

## 2022-02-02 PROCEDURE — 99213 OFFICE O/P EST LOW 20 MIN: CPT | Mod: 25

## 2022-02-07 ENCOUNTER — APPOINTMENT (OUTPATIENT)
Dept: HUMAN REPRODUCTION | Facility: CLINIC | Age: 42
End: 2022-02-07
Payer: COMMERCIAL

## 2022-02-07 PROCEDURE — 76830 TRANSVAGINAL US NON-OB: CPT

## 2022-02-07 PROCEDURE — 99213 OFFICE O/P EST LOW 20 MIN: CPT | Mod: 25

## 2022-02-08 ENCOUNTER — APPOINTMENT (OUTPATIENT)
Dept: HUMAN REPRODUCTION | Facility: CLINIC | Age: 42
End: 2022-02-08
Payer: COMMERCIAL

## 2022-02-08 PROCEDURE — 89353 THAWING CRYOPRESRVED SPERM: CPT

## 2022-02-08 PROCEDURE — 99213 OFFICE O/P EST LOW 20 MIN: CPT | Mod: 25

## 2022-02-08 PROCEDURE — 58322 ARTIFICIAL INSEMINATION: CPT

## 2022-02-22 ENCOUNTER — APPOINTMENT (OUTPATIENT)
Dept: HUMAN REPRODUCTION | Facility: CLINIC | Age: 42
End: 2022-02-22

## 2022-04-11 ENCOUNTER — APPOINTMENT (OUTPATIENT)
Dept: OBGYN | Facility: CLINIC | Age: 42
End: 2022-04-11
Payer: COMMERCIAL

## 2022-04-11 VITALS
SYSTOLIC BLOOD PRESSURE: 112 MMHG | DIASTOLIC BLOOD PRESSURE: 74 MMHG | BODY MASS INDEX: 22.73 KG/M2 | HEIGHT: 68 IN | WEIGHT: 150 LBS

## 2022-04-11 PROCEDURE — 76817 TRANSVAGINAL US OBSTETRIC: CPT

## 2022-04-11 PROCEDURE — 36415 COLL VENOUS BLD VENIPUNCTURE: CPT

## 2022-04-11 PROCEDURE — 0500F INITIAL PRENATAL CARE VISIT: CPT

## 2022-04-12 LAB
25(OH)D3 SERPL-MCNC: 37.1 NG/ML
ABO + RH PNL BLD: NORMAL
BASOPHILS # BLD AUTO: 0.05 K/UL
BASOPHILS NFR BLD AUTO: 0.7 %
BLD GP AB SCN SERPL QL: NORMAL
EOSINOPHIL # BLD AUTO: 0.08 K/UL
EOSINOPHIL NFR BLD AUTO: 1.2 %
HCT VFR BLD CALC: 43.8 %
HGB BLD-MCNC: 14 G/DL
IMM GRANULOCYTES NFR BLD AUTO: 0.4 %
LYMPHOCYTES # BLD AUTO: 1.48 K/UL
LYMPHOCYTES NFR BLD AUTO: 22.2 %
MAN DIFF?: NORMAL
MCHC RBC-ENTMCNC: 28.9 PG
MCHC RBC-ENTMCNC: 32 GM/DL
MCV RBC AUTO: 90.3 FL
MONOCYTES # BLD AUTO: 0.63 K/UL
MONOCYTES NFR BLD AUTO: 9.4 %
NEUTROPHILS # BLD AUTO: 4.41 K/UL
NEUTROPHILS NFR BLD AUTO: 66.1 %
PLATELET # BLD AUTO: 189 K/UL
RBC # BLD: 4.85 M/UL
RBC # FLD: 12.8 %
TSH SERPL-ACNC: 0.68 UIU/ML
WBC # FLD AUTO: 6.68 K/UL

## 2022-04-13 LAB
B19V IGG SER QL IA: 2.47 INDEX
B19V IGG+IGM SER-IMP: NORMAL
B19V IGG+IGM SER-IMP: POSITIVE
B19V IGM FLD-ACNC: 0.3 INDEX
B19V IGM SER-ACNC: NEGATIVE
HBV SURFACE AG SER QL: NONREACTIVE
HGB A MFR BLD: 97.1 %
HGB A2 MFR BLD: 2.9 %
HGB FRACT BLD-IMP: NORMAL
HIV1+2 AB SPEC QL IA.RAPID: NONREACTIVE
LEAD BLD-MCNC: <1 UG/DL
MEV IGG FLD QL IA: >300 AU/ML
MEV IGG+IGM SER-IMP: POSITIVE
MUV AB SER-ACNC: POSITIVE
MUV IGG SER QL IA: 92.9 AU/ML
RUBV IGG FLD-ACNC: 17.2 INDEX
RUBV IGG SER-IMP: POSITIVE
T PALLIDUM AB SER QL IA: NEGATIVE
VZV AB TITR SER: POSITIVE
VZV IGG SER IF-ACNC: 628.5 INDEX

## 2022-04-18 ENCOUNTER — NON-APPOINTMENT (OUTPATIENT)
Age: 42
End: 2022-04-18

## 2022-04-25 ENCOUNTER — NON-APPOINTMENT (OUTPATIENT)
Age: 42
End: 2022-04-25

## 2022-05-01 ENCOUNTER — FORM ENCOUNTER (OUTPATIENT)
Age: 42
End: 2022-05-01

## 2022-05-02 ENCOUNTER — NON-APPOINTMENT (OUTPATIENT)
Age: 42
End: 2022-05-02

## 2022-05-02 ENCOUNTER — APPOINTMENT (OUTPATIENT)
Dept: OBGYN | Facility: CLINIC | Age: 42
End: 2022-05-02
Payer: COMMERCIAL

## 2022-05-02 VITALS
HEIGHT: 68 IN | SYSTOLIC BLOOD PRESSURE: 106 MMHG | DIASTOLIC BLOOD PRESSURE: 64 MMHG | WEIGHT: 149.91 LBS | BODY MASS INDEX: 22.72 KG/M2

## 2022-05-02 DIAGNOSIS — O09.811 SUPERVISION OF PREGNANCY RESULTING FROM ASSISTED REPRODUCTIVE TECHNOLOGY, FIRST TRIMESTER: ICD-10-CM

## 2022-05-02 PROCEDURE — 0502F SUBSEQUENT PRENATAL CARE: CPT

## 2022-05-02 PROCEDURE — 36415 COLL VENOUS BLD VENIPUNCTURE: CPT

## 2022-05-09 DIAGNOSIS — Z86.018 PERSONAL HISTORY OF OTHER BENIGN NEOPLASM: ICD-10-CM

## 2022-05-09 DIAGNOSIS — Z86.39 PERSONAL HISTORY OF OTHER ENDOCRINE, NUTRITIONAL AND METABOLIC DISEASE: ICD-10-CM

## 2022-05-09 DIAGNOSIS — Z80.6 FAMILY HISTORY OF LEUKEMIA: ICD-10-CM

## 2022-05-16 ENCOUNTER — ASOB RESULT (OUTPATIENT)
Age: 42
End: 2022-05-16

## 2022-05-16 ENCOUNTER — LABORATORY RESULT (OUTPATIENT)
Age: 42
End: 2022-05-16

## 2022-05-16 ENCOUNTER — APPOINTMENT (OUTPATIENT)
Dept: ANTEPARTUM | Facility: CLINIC | Age: 42
End: 2022-05-16
Payer: COMMERCIAL

## 2022-05-16 PROCEDURE — 76801 OB US < 14 WKS SINGLE FETUS: CPT

## 2022-05-16 PROCEDURE — 76813 OB US NUCHAL MEAS 1 GEST: CPT

## 2022-05-26 ENCOUNTER — NON-APPOINTMENT (OUTPATIENT)
Age: 42
End: 2022-05-26

## 2022-05-31 ENCOUNTER — APPOINTMENT (OUTPATIENT)
Dept: OBGYN | Facility: CLINIC | Age: 42
End: 2022-05-31
Payer: COMMERCIAL

## 2022-05-31 VITALS
BODY MASS INDEX: 23.42 KG/M2 | HEART RATE: 60 BPM | DIASTOLIC BLOOD PRESSURE: 74 MMHG | WEIGHT: 154.54 LBS | SYSTOLIC BLOOD PRESSURE: 111 MMHG | HEIGHT: 68 IN

## 2022-05-31 PROCEDURE — 0502F SUBSEQUENT PRENATAL CARE: CPT

## 2022-06-15 ENCOUNTER — NON-APPOINTMENT (OUTPATIENT)
Age: 42
End: 2022-06-15

## 2022-06-15 ENCOUNTER — APPOINTMENT (OUTPATIENT)
Dept: OBGYN | Facility: CLINIC | Age: 42
End: 2022-06-15
Payer: COMMERCIAL

## 2022-06-15 VITALS
DIASTOLIC BLOOD PRESSURE: 77 MMHG | SYSTOLIC BLOOD PRESSURE: 123 MMHG | BODY MASS INDEX: 23.64 KG/M2 | HEIGHT: 68 IN | WEIGHT: 156 LBS

## 2022-06-15 PROCEDURE — 0502F SUBSEQUENT PRENATAL CARE: CPT

## 2022-06-15 PROCEDURE — 36415 COLL VENOUS BLD VENIPUNCTURE: CPT

## 2022-07-08 ENCOUNTER — ASOB RESULT (OUTPATIENT)
Age: 42
End: 2022-07-08

## 2022-07-08 ENCOUNTER — APPOINTMENT (OUTPATIENT)
Dept: ANTEPARTUM | Facility: CLINIC | Age: 42
End: 2022-07-08

## 2022-07-08 PROCEDURE — 76817 TRANSVAGINAL US OBSTETRIC: CPT

## 2022-07-08 PROCEDURE — 76811 OB US DETAILED SNGL FETUS: CPT

## 2022-07-18 ENCOUNTER — APPOINTMENT (OUTPATIENT)
Dept: OBGYN | Facility: CLINIC | Age: 42
End: 2022-07-18

## 2022-07-18 DIAGNOSIS — O09.522 SUPERVISION OF ELDERLY MULTIGRAVIDA, SECOND TRIMESTER: ICD-10-CM

## 2022-07-18 PROCEDURE — 0502F SUBSEQUENT PRENATAL CARE: CPT

## 2022-08-06 LAB
1ST TRIMESTER DATA: NORMAL
2ND TRIMESTER DATA: NORMAL
ADDENDUM DOC: NORMAL
AFP PNL SERPL: NORMAL
AFP SERPL-ACNC: NORMAL
AFP SERPL-ACNC: NORMAL
B-HCG FREE SERPL-MCNC: NORMAL
CLINICAL BIOCHEMIST REVIEW: NORMAL
FREE BETA HCG 1ST TRIMESTER: NORMAL
INHIBIN A SERPL-MCNC: NORMAL
INHIBIN-A 1ST TRIMESTER: NORMAL
NASAL BONE: PRESENT
NOTES NTD: NORMAL
NT: NORMAL
PAPP-A SERPL-ACNC: NORMAL
PIGF SER-MCNC: NORMAL
U ESTRIOL SERPL-SCNC: NORMAL

## 2022-08-17 ENCOUNTER — APPOINTMENT (OUTPATIENT)
Dept: OBGYN | Facility: CLINIC | Age: 42
End: 2022-08-17

## 2022-08-17 VITALS
WEIGHT: 172 LBS | SYSTOLIC BLOOD PRESSURE: 122 MMHG | DIASTOLIC BLOOD PRESSURE: 79 MMHG | BODY MASS INDEX: 26.07 KG/M2 | HEIGHT: 68 IN

## 2022-08-17 DIAGNOSIS — Z13.21 ENCOUNTER FOR SCREENING FOR NUTRITIONAL DISORDER: ICD-10-CM

## 2022-08-17 DIAGNOSIS — O09.812 SUPERVISION OF PREGNANCY RESULTING FROM ASSISTED REPRODUCTIVE TECHNOLOGY, SECOND TRIMESTER: ICD-10-CM

## 2022-08-17 PROCEDURE — 36415 COLL VENOUS BLD VENIPUNCTURE: CPT

## 2022-08-17 PROCEDURE — 0502F SUBSEQUENT PRENATAL CARE: CPT

## 2022-08-18 LAB
25(OH)D3 SERPL-MCNC: 29.8 NG/ML
BASOPHILS # BLD AUTO: 0.05 K/UL
BASOPHILS NFR BLD AUTO: 0.6 %
BLD GP AB SCN SERPL QL: NORMAL
EOSINOPHIL # BLD AUTO: 0.13 K/UL
EOSINOPHIL NFR BLD AUTO: 1.5 %
GLUCOSE 1H P 50 G GLC PO SERPL-MCNC: 64 MG/DL
HCT VFR BLD CALC: 34.9 %
HGB BLD-MCNC: 11.9 G/DL
HIV1+2 AB SPEC QL IA.RAPID: NONREACTIVE
IMM GRANULOCYTES NFR BLD AUTO: 1.4 %
LYMPHOCYTES # BLD AUTO: 1.56 K/UL
LYMPHOCYTES NFR BLD AUTO: 18.1 %
MAN DIFF?: NORMAL
MCHC RBC-ENTMCNC: 31 PG
MCHC RBC-ENTMCNC: 34.1 GM/DL
MCV RBC AUTO: 90.9 FL
MONOCYTES # BLD AUTO: 0.68 K/UL
MONOCYTES NFR BLD AUTO: 7.9 %
NEUTROPHILS # BLD AUTO: 6.09 K/UL
NEUTROPHILS NFR BLD AUTO: 70.5 %
PLATELET # BLD AUTO: 171 K/UL
RBC # BLD: 3.84 M/UL
RBC # FLD: 12.9 %
T PALLIDUM AB SER QL IA: NEGATIVE
WBC # FLD AUTO: 8.63 K/UL

## 2022-08-19 LAB
HCV AB SER QL: NONREACTIVE
HCV S/CO RATIO: 0.11 S/CO

## 2022-09-02 ENCOUNTER — APPOINTMENT (OUTPATIENT)
Dept: OBGYN | Facility: CLINIC | Age: 42
End: 2022-09-02

## 2022-09-02 ENCOUNTER — ASOB RESULT (OUTPATIENT)
Age: 42
End: 2022-09-02

## 2022-09-02 VITALS
HEIGHT: 68 IN | DIASTOLIC BLOOD PRESSURE: 74 MMHG | WEIGHT: 171 LBS | SYSTOLIC BLOOD PRESSURE: 128 MMHG | BODY MASS INDEX: 25.91 KG/M2

## 2022-09-02 PROCEDURE — 76816 OB US FOLLOW-UP PER FETUS: CPT

## 2022-09-02 PROCEDURE — 0502F SUBSEQUENT PRENATAL CARE: CPT

## 2022-09-12 ENCOUNTER — NON-APPOINTMENT (OUTPATIENT)
Age: 42
End: 2022-09-12

## 2022-09-13 ENCOUNTER — APPOINTMENT (OUTPATIENT)
Dept: OBGYN | Facility: CLINIC | Age: 42
End: 2022-09-13

## 2022-09-13 ENCOUNTER — ASOB RESULT (OUTPATIENT)
Age: 42
End: 2022-09-13

## 2022-09-13 VITALS
DIASTOLIC BLOOD PRESSURE: 69 MMHG | SYSTOLIC BLOOD PRESSURE: 117 MMHG | WEIGHT: 174 LBS | HEIGHT: 68 IN | BODY MASS INDEX: 26.37 KG/M2

## 2022-09-13 PROCEDURE — 76815 OB US LIMITED FETUS(S): CPT

## 2022-09-13 PROCEDURE — 0502F SUBSEQUENT PRENATAL CARE: CPT

## 2022-09-26 ENCOUNTER — NON-APPOINTMENT (OUTPATIENT)
Age: 42
End: 2022-09-26

## 2022-09-26 ENCOUNTER — ASOB RESULT (OUTPATIENT)
Age: 42
End: 2022-09-26

## 2022-09-26 ENCOUNTER — APPOINTMENT (OUTPATIENT)
Dept: OBGYN | Facility: CLINIC | Age: 42
End: 2022-09-26

## 2022-09-26 PROCEDURE — 76816 OB US FOLLOW-UP PER FETUS: CPT

## 2022-09-26 PROCEDURE — 0502F SUBSEQUENT PRENATAL CARE: CPT

## 2022-09-30 ENCOUNTER — APPOINTMENT (OUTPATIENT)
Dept: OBGYN | Facility: CLINIC | Age: 42
End: 2022-09-30

## 2022-10-10 ENCOUNTER — APPOINTMENT (OUTPATIENT)
Dept: OBGYN | Facility: CLINIC | Age: 42
End: 2022-10-10

## 2022-10-12 ENCOUNTER — NON-APPOINTMENT (OUTPATIENT)
Age: 42
End: 2022-10-12

## 2022-10-14 ENCOUNTER — APPOINTMENT (OUTPATIENT)
Dept: OBGYN | Facility: CLINIC | Age: 42
End: 2022-10-14

## 2022-10-14 VITALS
SYSTOLIC BLOOD PRESSURE: 127 MMHG | DIASTOLIC BLOOD PRESSURE: 76 MMHG | WEIGHT: 175 LBS | HEIGHT: 68 IN | BODY MASS INDEX: 26.52 KG/M2

## 2022-10-14 DIAGNOSIS — O09.513 SUPERVISION OF ELDERLY PRIMIGRAVIDA, THIRD TRIMESTER: ICD-10-CM

## 2022-10-14 PROCEDURE — 0502F SUBSEQUENT PRENATAL CARE: CPT

## 2022-10-26 ENCOUNTER — NON-APPOINTMENT (OUTPATIENT)
Age: 42
End: 2022-10-26

## 2022-10-26 ENCOUNTER — APPOINTMENT (OUTPATIENT)
Dept: OBGYN | Facility: CLINIC | Age: 42
End: 2022-10-26

## 2022-10-26 ENCOUNTER — ASOB RESULT (OUTPATIENT)
Age: 42
End: 2022-10-26

## 2022-10-26 PROCEDURE — 76816 OB US FOLLOW-UP PER FETUS: CPT | Mod: 59

## 2022-10-26 PROCEDURE — 0502F SUBSEQUENT PRENATAL CARE: CPT

## 2022-10-26 PROCEDURE — 76818 FETAL BIOPHYS PROFILE W/NST: CPT

## 2022-10-29 LAB — B-HEM STREP SPEC QL CULT: NORMAL

## 2022-11-01 ENCOUNTER — APPOINTMENT (OUTPATIENT)
Dept: OBGYN | Facility: CLINIC | Age: 42
End: 2022-11-01

## 2022-11-01 ENCOUNTER — ASOB RESULT (OUTPATIENT)
Age: 42
End: 2022-11-01

## 2022-11-01 PROCEDURE — 0502F SUBSEQUENT PRENATAL CARE: CPT

## 2022-11-01 PROCEDURE — 76818 FETAL BIOPHYS PROFILE W/NST: CPT

## 2022-11-02 ENCOUNTER — APPOINTMENT (OUTPATIENT)
Dept: OBGYN | Facility: CLINIC | Age: 42
End: 2022-11-02

## 2022-11-03 ENCOUNTER — OUTPATIENT (OUTPATIENT)
Dept: OUTPATIENT SERVICES | Facility: HOSPITAL | Age: 42
LOS: 1 days | End: 2022-11-03
Payer: COMMERCIAL

## 2022-11-03 VITALS
DIASTOLIC BLOOD PRESSURE: 79 MMHG | OXYGEN SATURATION: 97 % | TEMPERATURE: 98 F | RESPIRATION RATE: 14 BRPM | HEIGHT: 68 IN | SYSTOLIC BLOOD PRESSURE: 122 MMHG | WEIGHT: 179.02 LBS | HEART RATE: 68 BPM

## 2022-11-03 DIAGNOSIS — Z98.890 OTHER SPECIFIED POSTPROCEDURAL STATES: ICD-10-CM

## 2022-11-03 DIAGNOSIS — Z01.818 ENCOUNTER FOR OTHER PREPROCEDURAL EXAMINATION: ICD-10-CM

## 2022-11-03 DIAGNOSIS — O09.512 SUPERVISION OF ELDERLY PRIMIGRAVIDA, SECOND TRIMESTER: ICD-10-CM

## 2022-11-03 DIAGNOSIS — Z98.890 OTHER SPECIFIED POSTPROCEDURAL STATES: Chronic | ICD-10-CM

## 2022-11-03 LAB
BLD GP AB SCN SERPL QL: NEGATIVE — SIGNIFICANT CHANGE UP
HCT VFR BLD CALC: 32.5 % — LOW (ref 34.5–45)
HGB BLD-MCNC: 11.1 G/DL — LOW (ref 11.5–15.5)
MCHC RBC-ENTMCNC: 29.7 PG — SIGNIFICANT CHANGE UP (ref 27–34)
MCHC RBC-ENTMCNC: 34.2 GM/DL — SIGNIFICANT CHANGE UP (ref 32–36)
MCV RBC AUTO: 86.9 FL — SIGNIFICANT CHANGE UP (ref 80–100)
NRBC # BLD: 0 /100 WBCS — SIGNIFICANT CHANGE UP (ref 0–0)
PLATELET # BLD AUTO: 173 K/UL — SIGNIFICANT CHANGE UP (ref 150–400)
RBC # BLD: 3.74 M/UL — LOW (ref 3.8–5.2)
RBC # FLD: 12.2 % — SIGNIFICANT CHANGE UP (ref 10.3–14.5)
RH IG SCN BLD-IMP: NEGATIVE — SIGNIFICANT CHANGE UP
WBC # BLD: 8 K/UL — SIGNIFICANT CHANGE UP (ref 3.8–10.5)
WBC # FLD AUTO: 8 K/UL — SIGNIFICANT CHANGE UP (ref 3.8–10.5)

## 2022-11-03 PROCEDURE — 86901 BLOOD TYPING SEROLOGIC RH(D): CPT

## 2022-11-03 PROCEDURE — 85027 COMPLETE CBC AUTOMATED: CPT

## 2022-11-03 PROCEDURE — 86900 BLOOD TYPING SEROLOGIC ABO: CPT

## 2022-11-03 PROCEDURE — 86850 RBC ANTIBODY SCREEN: CPT

## 2022-11-03 PROCEDURE — G0463: CPT

## 2022-11-03 RX ORDER — SODIUM CHLORIDE 9 MG/ML
1000 INJECTION, SOLUTION INTRAVENOUS
Refills: 0 | Status: DISCONTINUED | OUTPATIENT
Start: 2022-11-14 | End: 2022-11-14

## 2022-11-03 RX ORDER — OXYTOCIN 10 UNIT/ML
333.33 VIAL (ML) INJECTION
Qty: 20 | Refills: 0 | Status: DISCONTINUED | OUTPATIENT
Start: 2022-11-14 | End: 2022-11-16

## 2022-11-03 NOTE — OB PST NOTE - HISTORY OF PRESENT ILLNESS
41 yo female. . MISTY 2022. IUP at 36+ weeks gestation.  PSH myomectomy in .   presents to PST scheduled for c/section on  secondary to h/o myomecotmy.  covid test scheduled on  at [facility].  41 yo female. . MISTY 2022. IUP at 36+ weeks gestation.  PSH myomectomy in .   presents to [facility] scheduled for c/section on  secondary to h/o myomecotmy.  covid test scheduled on  at [facility].   **PMH goiter, pt denies SOB, difficulty with swallowing. last TSH WNL, sees endocrinologist, Dr. Pool Kimble ([phone]). Pt has h/o myomectomy in  under general anesthesia, pt stated everything went well during surgery.  discussed case with anesthesiologist, Dr. Perez who examined pt.  no intervention required.**

## 2022-11-03 NOTE — OB PST NOTE - NSICDXPASTSURGICALHX_GEN_ALL_CORE_FT
PAST SURGICAL HISTORY:  H/O myomectomy laparoscopic 2020    S/P bunionectomy 2017 / bilateral    S/P D&C (status post dilation and curettage)

## 2022-11-03 NOTE — OB PST NOTE - NSICDXPASTMEDICALHX_GEN_ALL_CORE_FT
PAST MEDICAL HISTORY:  MVP (mitral valve prolapse) benign     PAST MEDICAL HISTORY:  H/O goiter     MVP (mitral valve prolapse) benign

## 2022-11-09 ENCOUNTER — NON-APPOINTMENT (OUTPATIENT)
Age: 42
End: 2022-11-09

## 2022-11-09 ENCOUNTER — APPOINTMENT (OUTPATIENT)
Dept: OBGYN | Facility: CLINIC | Age: 42
End: 2022-11-09

## 2022-11-09 ENCOUNTER — ASOB RESULT (OUTPATIENT)
Age: 42
End: 2022-11-09

## 2022-11-09 VITALS
BODY MASS INDEX: 27.58 KG/M2 | SYSTOLIC BLOOD PRESSURE: 122 MMHG | HEIGHT: 68 IN | HEART RATE: 79 BPM | WEIGHT: 182 LBS | DIASTOLIC BLOOD PRESSURE: 70 MMHG

## 2022-11-09 PROCEDURE — 0502F SUBSEQUENT PRENATAL CARE: CPT

## 2022-11-09 PROCEDURE — 76818 FETAL BIOPHYS PROFILE W/NST: CPT

## 2022-11-12 ENCOUNTER — OUTPATIENT (OUTPATIENT)
Dept: OUTPATIENT SERVICES | Facility: HOSPITAL | Age: 42
LOS: 1 days | End: 2022-11-12
Payer: COMMERCIAL

## 2022-11-12 DIAGNOSIS — Z11.52 ENCOUNTER FOR SCREENING FOR COVID-19: ICD-10-CM

## 2022-11-12 DIAGNOSIS — Z98.890 OTHER SPECIFIED POSTPROCEDURAL STATES: Chronic | ICD-10-CM

## 2022-11-12 LAB — SARS-COV-2 RNA SPEC QL NAA+PROBE: SIGNIFICANT CHANGE UP

## 2022-11-12 PROCEDURE — U0005: CPT

## 2022-11-12 PROCEDURE — U0003: CPT

## 2022-11-12 PROCEDURE — C9803: CPT

## 2022-11-13 ENCOUNTER — TRANSCRIPTION ENCOUNTER (OUTPATIENT)
Age: 42
End: 2022-11-13

## 2022-11-14 ENCOUNTER — INPATIENT (INPATIENT)
Facility: HOSPITAL | Age: 42
LOS: 1 days | Discharge: HOME | End: 2022-11-16
Attending: OBSTETRICS & GYNECOLOGY | Admitting: OBSTETRICS & GYNECOLOGY
Payer: COMMERCIAL

## 2022-11-14 ENCOUNTER — NON-APPOINTMENT (OUTPATIENT)
Age: 42
End: 2022-11-14

## 2022-11-14 ENCOUNTER — APPOINTMENT (OUTPATIENT)
Dept: OBGYN | Facility: HOSPITAL | Age: 42
End: 2022-11-14

## 2022-11-14 VITALS
HEART RATE: 80 BPM | RESPIRATION RATE: 18 BRPM | DIASTOLIC BLOOD PRESSURE: 85 MMHG | TEMPERATURE: 98 F | SYSTOLIC BLOOD PRESSURE: 132 MMHG

## 2022-11-14 DIAGNOSIS — Z98.890 OTHER SPECIFIED POSTPROCEDURAL STATES: Chronic | ICD-10-CM

## 2022-11-14 DIAGNOSIS — O09.512 SUPERVISION OF ELDERLY PRIMIGRAVIDA, SECOND TRIMESTER: ICD-10-CM

## 2022-11-14 LAB
BLD GP AB SCN SERPL QL: NEGATIVE — SIGNIFICANT CHANGE UP
COVID-19 SPIKE DOMAIN AB INTERP: POSITIVE
COVID-19 SPIKE DOMAIN ANTIBODY RESULT: >250 U/ML — HIGH
RH IG SCN BLD-IMP: NEGATIVE — SIGNIFICANT CHANGE UP
SARS-COV-2 IGG+IGM SERPL QL IA: >250 U/ML — HIGH
SARS-COV-2 IGG+IGM SERPL QL IA: POSITIVE
T PALLIDUM AB TITR SER: NEGATIVE — SIGNIFICANT CHANGE UP

## 2022-11-14 PROCEDURE — 59510 CESAREAN DELIVERY: CPT

## 2022-11-14 DEVICE — INTERCEED 3 X 4": Type: IMPLANTABLE DEVICE | Status: FUNCTIONAL

## 2022-11-14 DEVICE — SURGICEL FIBRILLAR 2 X 4": Type: IMPLANTABLE DEVICE | Status: FUNCTIONAL

## 2022-11-14 RX ORDER — OXYTOCIN 10 UNIT/ML
333.33 VIAL (ML) INJECTION
Qty: 20 | Refills: 0 | Status: DISCONTINUED | OUTPATIENT
Start: 2022-11-14 | End: 2022-11-16

## 2022-11-14 RX ORDER — HEPARIN SODIUM 5000 [USP'U]/ML
5000 INJECTION INTRAVENOUS; SUBCUTANEOUS EVERY 12 HOURS
Refills: 0 | Status: DISCONTINUED | OUTPATIENT
Start: 2022-11-14 | End: 2022-11-16

## 2022-11-14 RX ORDER — DEXAMETHASONE 0.5 MG/5ML
4 ELIXIR ORAL EVERY 6 HOURS
Refills: 0 | Status: DISCONTINUED | OUTPATIENT
Start: 2022-11-14 | End: 2022-11-15

## 2022-11-14 RX ORDER — ACETAMINOPHEN 500 MG
975 TABLET ORAL
Refills: 0 | Status: DISCONTINUED | OUTPATIENT
Start: 2022-11-14 | End: 2022-11-16

## 2022-11-14 RX ORDER — IBUPROFEN 200 MG
600 TABLET ORAL EVERY 6 HOURS
Refills: 0 | Status: COMPLETED | OUTPATIENT
Start: 2022-11-14 | End: 2023-10-13

## 2022-11-14 RX ORDER — DIPHENHYDRAMINE HCL 50 MG
25 CAPSULE ORAL EVERY 6 HOURS
Refills: 0 | Status: DISCONTINUED | OUTPATIENT
Start: 2022-11-14 | End: 2022-11-16

## 2022-11-14 RX ORDER — FAMOTIDINE 10 MG/ML
20 INJECTION INTRAVENOUS ONCE
Refills: 0 | Status: COMPLETED | OUTPATIENT
Start: 2022-11-14 | End: 2022-11-14

## 2022-11-14 RX ORDER — INFLUENZA VIRUS VACCINE 15; 15; 15; 15 UG/.5ML; UG/.5ML; UG/.5ML; UG/.5ML
0.5 SUSPENSION INTRAMUSCULAR ONCE
Refills: 0 | Status: DISCONTINUED | OUTPATIENT
Start: 2022-11-14 | End: 2022-11-16

## 2022-11-14 RX ORDER — TETANUS TOXOID, REDUCED DIPHTHERIA TOXOID AND ACELLULAR PERTUSSIS VACCINE, ADSORBED 5; 2.5; 8; 8; 2.5 [IU]/.5ML; [IU]/.5ML; UG/.5ML; UG/.5ML; UG/.5ML
0.5 SUSPENSION INTRAMUSCULAR ONCE
Refills: 0 | Status: DISCONTINUED | OUTPATIENT
Start: 2022-11-14 | End: 2022-11-16

## 2022-11-14 RX ORDER — OXYCODONE HYDROCHLORIDE 5 MG/1
5 TABLET ORAL ONCE
Refills: 0 | Status: DISCONTINUED | OUTPATIENT
Start: 2022-11-14 | End: 2022-11-16

## 2022-11-14 RX ORDER — CITRIC ACID/SODIUM CITRATE 300-500 MG
15 SOLUTION, ORAL ORAL ONCE
Refills: 0 | Status: COMPLETED | OUTPATIENT
Start: 2022-11-14 | End: 2022-11-14

## 2022-11-14 RX ORDER — SIMETHICONE 80 MG/1
80 TABLET, CHEWABLE ORAL EVERY 4 HOURS
Refills: 0 | Status: DISCONTINUED | OUTPATIENT
Start: 2022-11-14 | End: 2022-11-16

## 2022-11-14 RX ORDER — CEFAZOLIN SODIUM 1 G
2000 VIAL (EA) INJECTION ONCE
Refills: 0 | Status: COMPLETED | OUTPATIENT
Start: 2022-11-14 | End: 2022-11-14

## 2022-11-14 RX ORDER — MAGNESIUM HYDROXIDE 400 MG/1
30 TABLET, CHEWABLE ORAL
Refills: 0 | Status: DISCONTINUED | OUTPATIENT
Start: 2022-11-14 | End: 2022-11-16

## 2022-11-14 RX ORDER — SODIUM CHLORIDE 9 MG/ML
1000 INJECTION, SOLUTION INTRAVENOUS ONCE
Refills: 0 | Status: COMPLETED | OUTPATIENT
Start: 2022-11-14 | End: 2022-11-14

## 2022-11-14 RX ORDER — SODIUM CHLORIDE 9 MG/ML
1000 INJECTION, SOLUTION INTRAVENOUS
Refills: 0 | Status: DISCONTINUED | OUTPATIENT
Start: 2022-11-14 | End: 2022-11-16

## 2022-11-14 RX ORDER — NALOXONE HYDROCHLORIDE 4 MG/.1ML
0.1 SPRAY NASAL
Refills: 0 | Status: DISCONTINUED | OUTPATIENT
Start: 2022-11-14 | End: 2022-11-15

## 2022-11-14 RX ORDER — LANOLIN
1 OINTMENT (GRAM) TOPICAL EVERY 6 HOURS
Refills: 0 | Status: DISCONTINUED | OUTPATIENT
Start: 2022-11-14 | End: 2022-11-16

## 2022-11-14 RX ORDER — MORPHINE SULFATE 50 MG/1
0.1 CAPSULE, EXTENDED RELEASE ORAL ONCE
Refills: 0 | Status: DISCONTINUED | OUTPATIENT
Start: 2022-11-14 | End: 2022-11-15

## 2022-11-14 RX ORDER — ONDANSETRON 8 MG/1
4 TABLET, FILM COATED ORAL EVERY 6 HOURS
Refills: 0 | Status: DISCONTINUED | OUTPATIENT
Start: 2022-11-14 | End: 2022-11-15

## 2022-11-14 RX ORDER — OXYCODONE HYDROCHLORIDE 5 MG/1
5 TABLET ORAL
Refills: 0 | Status: DISCONTINUED | OUTPATIENT
Start: 2022-11-14 | End: 2022-11-16

## 2022-11-14 RX ORDER — KETOROLAC TROMETHAMINE 30 MG/ML
30 SYRINGE (ML) INJECTION EVERY 6 HOURS
Refills: 0 | Status: COMPLETED | OUTPATIENT
Start: 2022-11-14 | End: 2022-11-15

## 2022-11-14 RX ADMIN — Medication 975 MILLIGRAM(S): at 21:18

## 2022-11-14 RX ADMIN — Medication 975 MILLIGRAM(S): at 21:48

## 2022-11-14 RX ADMIN — HEPARIN SODIUM 5000 UNIT(S): 5000 INJECTION INTRAVENOUS; SUBCUTANEOUS at 21:18

## 2022-11-14 RX ADMIN — Medication 30 MILLIGRAM(S): at 17:45

## 2022-11-14 RX ADMIN — FAMOTIDINE 20 MILLIGRAM(S): 10 INJECTION INTRAVENOUS at 09:52

## 2022-11-14 RX ADMIN — SODIUM CHLORIDE 125 MILLILITER(S): 9 INJECTION, SOLUTION INTRAVENOUS at 09:45

## 2022-11-14 RX ADMIN — Medication 975 MILLIGRAM(S): at 16:41

## 2022-11-14 RX ADMIN — SODIUM CHLORIDE 2000 MILLILITER(S): 9 INJECTION, SOLUTION INTRAVENOUS at 09:15

## 2022-11-14 RX ADMIN — Medication 975 MILLIGRAM(S): at 16:11

## 2022-11-14 RX ADMIN — Medication 30 MILLIGRAM(S): at 18:34

## 2022-11-14 RX ADMIN — Medication 15 MILLILITER(S): at 09:48

## 2022-11-14 NOTE — OB RN DELIVERY SUMMARY - NSSELHIDDEN_OBGYN_ALL_OB_FT
[NS_DeliveryAttending1_OBGYN_ALL_OB_FT:[id number]==],[NS_DeliveryAssist1_OBGYN_ALL_OB_FT:[id number]],[NS_DeliveryRN_OBGYN_ALL_OB_FT:[id number]]

## 2022-11-14 NOTE — OB PROVIDER H&P - ASSESSMENT
41yo  at 38+3 presenting for scheduled pLTCS for h/o myomectomy   - Admit to L&D  - NST  - Routine labs  - NPO/IVF  - Pepcid/bicitra  - To OR    ASHTYN Sharma, PGY2

## 2022-11-14 NOTE — OB PROVIDER H&P - HISTORY OF PRESENT ILLNESS
41yo  at 38+3 presenting for scheduled pLTCS for h/o myomectomy     -VB, -LOF, -Ctx, +FM. denies fever, chills, nausea, vomiting, diarrhea, headache, constipation, dizziness, syncope, chest pain, palpitations, shortness of breath, dysuria, urgency, frequency.    PNC: [facility]  EFW: 3600 (6#6 on 10/26)     ObHx: 1x MAB s/p D&C  GynHx: denies  MedHx: denies  SrgHx: C Myomectomy (2019), D&C, bunionectomy   PsychHx: denies  SocialHx: denies  AllergyHx: denies  RxHx: denies

## 2022-11-14 NOTE — OB RN DELIVERY SUMMARY - BABY A: APGAR 5 MIN REFLEX IRRITABILITY, DELIVERY
[facility]    Cardiology Progress Note    Date of Service (when I saw the patient): 05/04/2020            Assessment and Plan:   ASSESSMENT:  1.  NSTEMI:  In the setting of COPD exacerbation.  May be a component of demand ischemia but interval worsening of LV function and new regional wall motion abnormalities  2.  Ischemic cardiomyopathy:  EF 35-40%  3.  COPD exacerbation:  On chronic O2, currently requiring biPAP.  Currently receiving steroids.    4.  Hx of thalamic CVA  5.  Confusion/AMS:  Oriented to self only; this may be baseline (?)  6.  Lung nodule:  Concerning for malignancy     RECOMMENDATIONS:  1.  Continue ASA/plavix, heparin gtt  2.  Continue statin/betablocker  3.  She is on appropriate medical therapy for management of NSTEMI.  Will consider diagnostic coronary angiogram depending on clinical course and goals of care.  She is currently oriented to self only and unclear what her baseline is.  A discussion of goals of care this admission would be reasonable.        Kenya He MD [facility]        Interval History:     Requiring intermittent bipap.  Unable to answer questions appropriately.             Medications:       aspirin  81 mg Oral Daily     atorvastatin  20 mg Oral At Bedtime     azithromycin  250 mg Oral Daily     cefTRIAXone  2 g Intravenous Q24H     clopidogrel  75 mg Oral Daily     fluticasone-vilanterol  1 puff Inhalation Daily     guaiFENesin  600 mg Oral BID     ipratropium  0.5 mg Nebulization 4x daily     metoprolol tartrate  6.25 mg Oral BID     montelukast  10 mg Oral At Bedtime     predniSONE  60 mg Oral Daily     sodium chloride (PF)  3 mL Intracatheter Q8H     sodium chloride (PF)  3 mL Intracatheter Q8H     umeclidinium  1 puff Inhalation Daily              Physical Exam:   Blood pressure 111/54, pulse 79, temperature 98.1  F (36.7  C), temperature source Oral, resp. rate 20, weight 44.4 kg (97 lb 14.4 oz), SpO2 95 %, not currently  breastfeeding.  Vitals:    20 0033 20 0400   Weight: 46.3 kg (102 lb 1.2 oz) 44.4 kg (97 lb 14.4 oz)       Intake/Output Summary (Last 24 hours) at 2020 1649  Last data filed at 2020 1417  Gross per 24 hour   Intake 2087.25 ml   Output 275 ml   Net 1812.25 ml           Vital Sign Ranges  Temperature Temp  Av.4  F (36.9  C)  Min: 98  F (36.7  C)  Max: 98.7  F (37.1  C)   Blood pressure Systolic (24hrs), Av , Min:111 , Max:134        Diastolic (24hrs), Av, Min:54, Max:82      Pulse Pulse  Av.8  Min: 74  Max: 86   Respirations Resp  Av.6  Min: 12  Max: 30   Pulse oximetry SpO2  Av.4 %  Min: 88 %  Max: 100 %         EXAM:    Constitutional:    in no apparent distress    Skin:    normal    Head:    Normocephalic, atramatic    Eyes:    pupils equal, round and reactive to light, extra ocular muscles intact, sclera clear, conjunctiva normal    Neck:    JVP    Lungs:       Cardiovascular:    S1, S2    Abdomen:    normal bowel sounds    Extremities and Back:    no cyanosis or clubbing and No edema    Neurological:    No gross or focal neurologic abnormalities               Data:     Recent Labs   Lab Test 20  0534 20  1018 20  0834  18  0911   WBC 6.3 6.9 7.8   < > 7.2   HGB 14.2 15.5 16.1*   < > 15.3   MCV 93 91 89   < > 90    197 232   < > 119*  119*   INR  --   --  0.84*  --  0.91    < > = values in this interval not displayed.      Recent Labs   Lab Test 20  0534 20  1018    137   POTASSIUM 4.5 4.6   CHLORIDE 101 103   BUN 19 16   CR 0.59 0.62     Recent Labs   Lab 20  0534 20  1018 20  0834   GLC 96 104* 129*     Recent Labs   Lab Test 20  0834 19  1003   ALT 34 40   AST 30 30     Troponin I ES   Date Value Ref Range Status   2020 2.893 () 0.000 - 0.045 ug/L Final     Comment:     The 99th percentile for upper reference range is 0.045 ug/L.  Troponin values   in the range of 0.045 -  0.120 ug/L may be associated with risks of adverse   clinical events.  Critical result, provider not notified due to previous critical result   notification.     05/03/2020 2.396 (HH) 0.000 - 0.045 ug/L Final     Comment:     The 99th percentile for upper reference range is 0.045 ug/L.  Troponin values   in the range of 0.045 - 0.120 ug/L may be associated with risks of adverse   clinical events.  Critical result, provider not notified due to previous critical result   notification.     05/03/2020 2.121 (HH) 0.000 - 0.045 ug/L Final     Comment:     The 99th percentile for upper reference range is 0.045 ug/L.  Troponin values   in the range of 0.045 - 0.120 ug/L may be associated with risks of adverse   clinical events.  Critical result, provider not notified due to previous critical result   notification.     05/02/2020 2.143 (HH) 0.000 - 0.045 ug/L Final     Comment:     The 99th percentile for upper reference range is 0.045 ug/L.  Troponin values   in the range of 0.045 - 0.120 ug/L may be associated with risks of adverse   clinical events.  Critical result, provider not notified due to previous critical result   notification.     05/02/2020 2.140 (HH) 0.000 - 0.045 ug/L Final     Comment:     The 99th percentile for upper reference range is 0.045 ug/L.  Troponin values   in the range of 0.045 - 0.120 ug/L may be associated with risks of adverse   clinical events.  Critical Value called to and read back by  BETTY DEL TORO IN 66 AT 1841 MS           Recent Labs   Lab Test 05/04/20  0534 05/26/18  0555 05/25/18 2050   MAG 2.0 2.2 1.9       Lab Results   Component Value Date    CHOL 193 10/02/2019     Lab Results   Component Value Date    HDL 81 10/02/2019     Lab Results   Component Value Date    LDL 99 10/02/2019     Lab Results   Component Value Date    TRIG 63 10/02/2019     Lab Results   Component Value Date    CHOLHDLRATIO 3.6 10/09/2015          TSH   Date Value Ref Range Status   08/18/2013 1.05 0.4 - 5.0  mU/L Final           Kenya He MD, FACC  Cardiology       (2) cough or sneeze

## 2022-11-14 NOTE — OB RN INTRAOPERATIVE NOTE - NSSELHIDDEN_OBGYN_ALL_OB_FT
[NS_DeliveryAttending1_OBGYN_ALL_OB_FT:[id number]==],[NS_DeliveryAssist1_OBGYN_ALL_OB_FT:[id number]],[NS_DeliveryRN_OBGYN_ALL_OB_FT:[id number]]

## 2022-11-14 NOTE — OB PROVIDER DELIVERY SUMMARY - NSSELHIDDEN_OBGYN_ALL_OB_FT
[NS_DeliveryAttending1_OBGYN_ALL_OB_FT:[id number]==],[NS_DeliveryAssist1_OBGYN_ALL_OB_FT:[id number]],[NS_DeliveryRN_OBGYN_ALL_OB_FT:[id number]]

## 2022-11-14 NOTE — OB PROVIDER DELIVERY SUMMARY - AMNIOTIC FLUID AMOUNT, LABOR
----- Message from Sanjay Edgar sent at 4/2/2018  8:38 AM CDT -----  Contact: hakan Rand with [facility]'s pharmacy called to get clarification on the strength of medication enalapril (VASOTEC)   Call back at [phone]   within normal limits

## 2022-11-14 NOTE — OB RN PATIENT PROFILE - NS_SOURCEOFINFO_OBGYN_ALL_OB
Doxycycline Pregnancy And Lactation Text: This medication is Pregnancy Category D and not consider safe during pregnancy. It is also excreted in breast milk but is considered safe for shorter treatment courses. Azithromycin Counseling:  I discussed with the patient the risks of azithromycin including but not limited to GI upset, allergic reaction, drug rash, diarrhea, and yeast infections. Topical Retinoid Pregnancy And Lactation Text: This medication is Pregnancy Category C. It is unknown if this medication is excreted in breast milk. Topical Clindamycin Pregnancy And Lactation Text: This medication is Pregnancy Category B and is considered safe during pregnancy. It is unknown if it is excreted in breast milk. Isotretinoin Pregnancy And Lactation Text: This medication is Pregnancy Category X and is considered extremely dangerous during pregnancy. It is unknown if it is excreted in breast milk. Birth Control Pills Counseling: Birth Control Pill Counseling: I discussed with the patient the potential side effects of OCPs including but not limited to increased risk of stroke, heart attack, thrombophlebitis, deep venous thrombosis, hepatic adenomas, breast changes, GI upset, headaches, and depression.  The patient verbalized understanding of the proper use and possible adverse effects of OCPs. All of the patient's questions and concerns were addressed. High Dose Vitamin A Counseling: Side effects reviewed, pt to contact office should one occur. Topical Sulfur Applications Counseling: Topical Sulfur Counseling: Patient counseled that this medication may cause skin irritation or allergic reactions.  In the event of skin irritation, the patient was advised to reduce the amount of the drug applied or use it less frequently.   The patient verbalized understanding of the proper use and possible adverse effects of topical sulfur application.  All of the patient's questions and concerns were addressed. Sarecycline Counseling: Patient advised regarding possible photosensitivity and discoloration of the teeth, skin, lips, tongue and gums.  Patient instructed to avoid sunlight, if possible.  When exposed to sunlight, patients should wear protective clothing, sunglasses, and sunscreen.  The patient was instructed to call the office immediately if the following severe adverse effects occur:  hearing changes, easy bruising/bleeding, severe headache, or vision changes.  The patient verbalized understanding of the proper use and possible adverse effects of sarecycline.  All of the patient's questions and concerns were addressed. Tetracycline Pregnancy And Lactation Text: This medication is Pregnancy Category D and not consider safe during pregnancy. It is also excreted in breast milk. Azithromycin Pregnancy And Lactation Text: This medication is considered safe during pregnancy and is also secreted in breast milk. Birth Control Pills Pregnancy And Lactation Text: This medication should be avoided if pregnant and for the first 30 days post-partum. Tazorac Counseling:  Patient advised that medication is irritating and drying.  Patient may need to apply sparingly and wash off after an hour before eventually leaving it on overnight.  The patient verbalized understanding of the proper use and possible adverse effects of tazorac.  All of the patient's questions and concerns were addressed. Dapsone Counseling: I discussed with the patient the risks of dapsone including but not limited to hemolytic anemia, agranulocytosis, rashes, methemoglobinemia, kidney failure, peripheral neuropathy, headaches, GI upset, and liver toxicity.  Patients who start dapsone require monitoring including baseline LFTs and weekly CBCs for the first month, then every month thereafter.  The patient verbalized understanding of the proper use and possible adverse effects of dapsone.  All of the patient's questions and concerns were addressed. Topical Sulfur Applications Pregnancy And Lactation Text: This medication is Pregnancy Category C and has an unknown safety profile during pregnancy. It is unknown if this topical medication is excreted in breast milk. Benzoyl Peroxide Counseling: Patient counseled that medicine may cause skin irritation and bleach clothing.  In the event of skin irritation, the patient was advised to reduce the amount of the drug applied or use it less frequently.   The patient verbalized understanding of the proper use and possible adverse effects of benzoyl peroxide.  All of the patient's questions and concerns were addressed. Erythromycin Counseling:  I discussed with the patient the risks of erythromycin including but not limited to GI upset, allergic reaction, drug rash, diarrhea, increase in liver enzymes, and yeast infections. High Dose Vitamin A Pregnancy And Lactation Text: High dose vitamin A therapy is contraindicated during pregnancy and breast feeding. Erythromycin Pregnancy And Lactation Text: This medication is Pregnancy Category B and is considered safe during pregnancy. It is also excreted in breast milk. Tazorac Pregnancy And Lactation Text: This medication is not safe during pregnancy. It is unknown if this medication is excreted in breast milk. Spironolactone Counseling: Patient advised regarding risks of diarrhea, abdominal pain, hyperkalemia, birth defects (for female patients), liver toxicity and renal toxicity. The patient may need blood work to monitor liver and kidney function and potassium levels while on therapy. The patient verbalized understanding of the proper use and possible adverse effects of spironolactone.  All of the patient's questions and concerns were addressed. Bactrim Counseling:  I discussed with the patient the risks of sulfa antibiotics including but not limited to GI upset, allergic reaction, drug rash, diarrhea, dizziness, photosensitivity, and yeast infections.  Rarely, more serious reactions can occur including but not limited to aplastic anemia, agranulocytosis, methemoglobinemia, blood dyscrasias, liver or kidney failure, lung infiltrates or desquamative/blistering drug rashes. Benzoyl Peroxide Pregnancy And Lactation Text: This medication is Pregnancy Category C. It is unknown if benzoyl peroxide is excreted in breast milk. Include Pregnancy/Lactation Warning?: No Topical Clindamycin Counseling: Patient counseled that this medication may cause skin irritation or allergic reactions.  In the event of skin irritation, the patient was advised to reduce the amount of the drug applied or use it less frequently.   The patient verbalized understanding of the proper use and possible adverse effects of clindamycin.  All of the patient's questions and concerns were addressed. Minocycline Counseling: Patient advised regarding possible photosensitivity and discoloration of the teeth, skin, lips, tongue and gums.  Patient instructed to avoid sunlight, if possible.  When exposed to sunlight, patients should wear protective clothing, sunglasses, and sunscreen.  The patient was instructed to call the office immediately if the following severe adverse effects occur:  hearing changes, easy bruising/bleeding, severe headache, or vision changes.  The patient verbalized understanding of the proper use and possible adverse effects of minocycline.  All of the patient's questions and concerns were addressed. Dapsone Pregnancy And Lactation Text: This medication is Pregnancy Category C and is not considered safe during pregnancy or breast feeding. Bactrim Pregnancy And Lactation Text: This medication is Pregnancy Category D and is known to cause fetal risk.  It is also excreted in breast milk. Detail Level: Simple Spironolactone Pregnancy And Lactation Text: This medication can cause feminization of the male fetus and should be avoided during pregnancy. The active metabolite is also found in breast milk. Isotretinoin Counseling: Patient should get monthly blood tests, not donate blood, not drive at night if vision affected, not share medication, and not undergo elective surgery for 6 months after tx completed. Side effects reviewed, pt to contact office should one occur. Tetracycline Counseling: Patient counseled regarding possible photosensitivity and increased risk for sunburn.  Patient instructed to avoid sunlight, if possible.  When exposed to sunlight, patients should wear protective clothing, sunglasses, and sunscreen.  The patient was instructed to call the office immediately if the following severe adverse effects occur:  hearing changes, easy bruising/bleeding, severe headache, or vision changes.  The patient verbalized understanding of the proper use and possible adverse effects of tetracycline.  All of the patient's questions and concerns were addressed. Patient understands to avoid pregnancy while on therapy due to potential birth defects. Topical Retinoid counseling:  Patient advised to apply a pea-sized amount only at bedtime and wait 30 minutes after washing their face before applying.  If too drying, patient may add a non-comedogenic moisturizer. The patient verbalized understanding of the proper use and possible adverse effects of retinoids.  All of the patient's questions and concerns were addressed. Doxycycline Counseling:  Patient counseled regarding possible photosensitivity and increased risk for sunburn.  Patient instructed to avoid sunlight, if possible.  When exposed to sunlight, patients should wear protective clothing, sunglasses, and sunscreen.  The patient was instructed to call the office immediately if the following severe adverse effects occur:  hearing changes, easy bruising/bleeding, severe headache, or vision changes.  The patient verbalized understanding of the proper use and possible adverse effects of doxycycline.  All of the patient's questions and concerns were addressed. Patient

## 2022-11-14 NOTE — OB PROVIDER DELIVERY SUMMARY - NSCSDELIVATYPE_OBGYN_ALL_OB
Called to schedule pt for Cardiomem, pt has a few questions in regards to recovery and whether she is able to travel by plane on May the 5th if she has the procedure on April 26th.
Pt called and rs Cardiomem to 5/16/2022 with an arrival time of 845
Scheduled pt for Cardiomem procedure on 5/20/2022 with Dr. Walden Stands at [facility] with an arrival time of 845am. Informed pt of needed labs between 4/20 and 5/18.  Reviewed instructions including no insulin 5/20/2022, confirmed understanding and had no further questions
Verified patient using two identifiers. Ms. Jelena Bryan stated that she had some upcoming travel and wondered if this procedure could be done after she returns from her trip, May 18th. She stated that although she is anxious to have the procedure done, she would be more comfortable having it done after he travel. Spoke with [facility] who will call to reschedule her appt.
Primary

## 2022-11-14 NOTE — OB RN DELIVERY SUMMARY - NS_SEPSISRSKCALC_OBGYN_ALL_OB_FT
EOS calculated successfully. EOS Risk Factor: 0.5/1000 live births ([facility] national incidence); GA=38w3d; Temp=98.1; ROM=0.017; GBS='Negative'; Antibiotics='No antibiotics or any antibiotics < 2 hrs prior to birth'

## 2022-11-14 NOTE — OB PROVIDER H&P - NSHPPHYSICALEXAM_GEN_ALL_CORE
ICU Vital Signs Last 24 Hrs  T(C): --  T(F): --  HR: 69 (14 Nov 2022 09:23) (66 - 80)  BP: 132/73 (14 Nov 2022 09:20) (132/73 - 132/85)  BP(mean): --  ABP: --  ABP(mean): --  RR: --  SpO2: 99% (14 Nov 2022 09:23) (99% - 100%)    Gen: NAD  Abd: Soft, nontender  Ext: No edema, no erythema

## 2022-11-14 NOTE — OB PROVIDER DELIVERY SUMMARY - NSPROVIDERDELIVERYNOTE_OBGYN_ALL_OB_FT
primary LTCS for h/o myomectomy  viable female infant, vertex presentation, Apgars 9/9, cord gasses sent  grossly normal fallopian tubes, uterus, and ovaries  uterus closed in 2 layer with PDS  Methergine and 10u extra pitocin given for atony   Fibrillar and intercede placed over hysterotomy       QBL: 1505  IVF: 2000  UOP: 300    Dictation #    ASHTYN Sharma, PGY-2  w/ Dr. Madrigal primary LTCS for h/o myomectomy  viable female infant, vertex presentation, Apgars 9/9, cord gasses sent  grossly normal fallopian tubes, uterus, and ovaries  uterus closed in 2 layer with PDS  Methergine and 10u extra pitocin given for atony   Fibrillar and intercede placed over hysterotomy       QBL: 1505  IVF: 2000  UOP: 300    Dictation #[id number]    ASHTYN Sharma, PGY-2  w/ Dr. Madrigal primary LTCS for h/o myomectomy  viable female infant, vertex presentation, Apgars 9/9, cord gasses sent  grossly normal fallopian tubes, uterus, and ovaries  uterus closed in 2 layer with caprosyn  Methergine and 10u extra pitocin given for atony   Fibrillar and intercede placed over hysterotomy       QBL: 1505  IVF: 2000  UOP: 300    Dictation #[id number]    ASHTYN Sharma, PGY-2  w/ Dr. Madrigal

## 2022-11-14 NOTE — OB RN PATIENT PROFILE - FUNCTIONAL ASSESSMENT - BASIC MOBILITY PT AGE POP HIDDEN
Adult My signature below certifies that the above stated patient is homebound and upon completion of the Face-To-Face encounter, has the need for intermittent skilled nursing, physical therapy and/or speech or occupational therapy services in their home for their current diagnosis as outlined in their initial plan of care. These services will continue to be monitored by myself or another physician.

## 2022-11-15 LAB
BASOPHILS # BLD AUTO: 0.04 K/UL — SIGNIFICANT CHANGE UP (ref 0–0.2)
BASOPHILS NFR BLD AUTO: 0.4 % — SIGNIFICANT CHANGE UP (ref 0–2)
EOSINOPHIL # BLD AUTO: 0.09 K/UL — SIGNIFICANT CHANGE UP (ref 0–0.5)
EOSINOPHIL NFR BLD AUTO: 0.8 % — SIGNIFICANT CHANGE UP (ref 0–6)
HCT VFR BLD CALC: 22.1 % — LOW (ref 34.5–45)
HGB BLD-MCNC: 7.4 G/DL — LOW (ref 11.5–15.5)
IMM GRANULOCYTES NFR BLD AUTO: 0.8 % — SIGNIFICANT CHANGE UP (ref 0–0.9)
LYMPHOCYTES # BLD AUTO: 1.52 K/UL — SIGNIFICANT CHANGE UP (ref 1–3.3)
LYMPHOCYTES # BLD AUTO: 13.5 % — SIGNIFICANT CHANGE UP (ref 13–44)
MCHC RBC-ENTMCNC: 29.4 PG — SIGNIFICANT CHANGE UP (ref 27–34)
MCHC RBC-ENTMCNC: 33.5 GM/DL — SIGNIFICANT CHANGE UP (ref 32–36)
MCV RBC AUTO: 87.7 FL — SIGNIFICANT CHANGE UP (ref 80–100)
MONOCYTES # BLD AUTO: 0.71 K/UL — SIGNIFICANT CHANGE UP (ref 0–0.9)
MONOCYTES NFR BLD AUTO: 6.3 % — SIGNIFICANT CHANGE UP (ref 2–14)
NEUTROPHILS # BLD AUTO: 8.83 K/UL — HIGH (ref 1.8–7.4)
NEUTROPHILS NFR BLD AUTO: 78.2 % — HIGH (ref 43–77)
NRBC # BLD: 0 /100 WBCS — SIGNIFICANT CHANGE UP (ref 0–0)
PLATELET # BLD AUTO: 134 K/UL — LOW (ref 150–400)
RBC # BLD: 2.52 M/UL — LOW (ref 3.8–5.2)
RBC # FLD: 12.3 % — SIGNIFICANT CHANGE UP (ref 10.3–14.5)
WBC # BLD: 11.28 K/UL — HIGH (ref 3.8–10.5)
WBC # FLD AUTO: 11.28 K/UL — HIGH (ref 3.8–10.5)

## 2022-11-15 RX ORDER — IBUPROFEN 200 MG
600 TABLET ORAL EVERY 6 HOURS
Refills: 0 | Status: DISCONTINUED | OUTPATIENT
Start: 2022-11-15 | End: 2022-11-16

## 2022-11-15 RX ADMIN — HEPARIN SODIUM 5000 UNIT(S): 5000 INJECTION INTRAVENOUS; SUBCUTANEOUS at 13:19

## 2022-11-15 RX ADMIN — Medication 975 MILLIGRAM(S): at 21:23

## 2022-11-15 RX ADMIN — Medication 975 MILLIGRAM(S): at 03:13

## 2022-11-15 RX ADMIN — Medication 30 MILLIGRAM(S): at 05:37

## 2022-11-15 RX ADMIN — Medication 600 MILLIGRAM(S): at 16:00

## 2022-11-15 RX ADMIN — Medication 975 MILLIGRAM(S): at 22:10

## 2022-11-15 RX ADMIN — Medication 975 MILLIGRAM(S): at 09:40

## 2022-11-15 RX ADMIN — Medication 975 MILLIGRAM(S): at 09:08

## 2022-11-15 RX ADMIN — Medication 975 MILLIGRAM(S): at 15:36

## 2022-11-15 RX ADMIN — Medication 30 MILLIGRAM(S): at 00:57

## 2022-11-15 RX ADMIN — Medication 600 MILLIGRAM(S): at 18:43

## 2022-11-15 RX ADMIN — Medication 975 MILLIGRAM(S): at 16:15

## 2022-11-15 RX ADMIN — Medication 600 MILLIGRAM(S): at 13:24

## 2022-11-15 RX ADMIN — Medication 30 MILLIGRAM(S): at 00:27

## 2022-11-15 RX ADMIN — Medication 975 MILLIGRAM(S): at 03:43

## 2022-11-16 ENCOUNTER — TRANSCRIPTION ENCOUNTER (OUTPATIENT)
Age: 42
End: 2022-11-16

## 2022-11-16 VITALS
OXYGEN SATURATION: 99 % | DIASTOLIC BLOOD PRESSURE: 66 MMHG | RESPIRATION RATE: 18 BRPM | SYSTOLIC BLOOD PRESSURE: 123 MMHG | TEMPERATURE: 98 F | HEART RATE: 61 BPM

## 2022-11-16 PROCEDURE — 59050 FETAL MONITOR W/REPORT: CPT

## 2022-11-16 PROCEDURE — 59025 FETAL NON-STRESS TEST: CPT

## 2022-11-16 PROCEDURE — 86780 TREPONEMA PALLIDUM: CPT

## 2022-11-16 PROCEDURE — 36415 COLL VENOUS BLD VENIPUNCTURE: CPT

## 2022-11-16 PROCEDURE — 86769 SARS-COV-2 COVID-19 ANTIBODY: CPT

## 2022-11-16 PROCEDURE — 86850 RBC ANTIBODY SCREEN: CPT

## 2022-11-16 PROCEDURE — 85025 COMPLETE CBC W/AUTO DIFF WBC: CPT

## 2022-11-16 PROCEDURE — C1765: CPT

## 2022-11-16 PROCEDURE — 86901 BLOOD TYPING SEROLOGIC RH(D): CPT

## 2022-11-16 PROCEDURE — 86900 BLOOD TYPING SEROLOGIC ABO: CPT

## 2022-11-16 PROCEDURE — C1889: CPT

## 2022-11-16 RX ORDER — ACETAMINOPHEN 500 MG
3 TABLET ORAL
Qty: 0 | Refills: 0 | DISCHARGE
Start: 2022-11-16

## 2022-11-16 RX ORDER — OXYCODONE HYDROCHLORIDE 5 MG/1
1 TABLET ORAL
Qty: 16 | Refills: 0
Start: 2022-11-16 | End: 2022-11-19

## 2022-11-16 RX ORDER — SIMETHICONE 80 MG/1
1 TABLET, CHEWABLE ORAL
Qty: 0 | Refills: 0 | DISCHARGE
Start: 2022-11-16

## 2022-11-16 RX ORDER — IBUPROFEN 200 MG
1 TABLET ORAL
Qty: 0 | Refills: 0 | DISCHARGE
Start: 2022-11-16

## 2022-11-16 RX ADMIN — Medication 600 MILLIGRAM(S): at 00:08

## 2022-11-16 RX ADMIN — Medication 600 MILLIGRAM(S): at 01:00

## 2022-11-16 RX ADMIN — Medication 975 MILLIGRAM(S): at 09:45

## 2022-11-16 RX ADMIN — Medication 600 MILLIGRAM(S): at 13:00

## 2022-11-16 RX ADMIN — HEPARIN SODIUM 5000 UNIT(S): 5000 INJECTION INTRAVENOUS; SUBCUTANEOUS at 00:09

## 2022-11-16 RX ADMIN — Medication 600 MILLIGRAM(S): at 12:27

## 2022-11-16 RX ADMIN — Medication 975 MILLIGRAM(S): at 04:00

## 2022-11-16 RX ADMIN — Medication 975 MILLIGRAM(S): at 15:15

## 2022-11-16 RX ADMIN — Medication 600 MILLIGRAM(S): at 06:26

## 2022-11-16 RX ADMIN — Medication 975 MILLIGRAM(S): at 03:03

## 2022-11-16 RX ADMIN — Medication 975 MILLIGRAM(S): at 09:15

## 2022-11-16 RX ADMIN — Medication 975 MILLIGRAM(S): at 14:44

## 2022-11-16 NOTE — DISCHARGE NOTE OB - NS MD DC FALL RISK RISK
For information on Fall & Injury Prevention, visit: [email or web address] OR  [email or web address] OR  https://www.cdc.gov/steadi/patient.html

## 2022-11-16 NOTE — DISCHARGE NOTE OB - PLAN OF CARE
Call your doctor for fevers, chills, nausea, vomiting, headaches that persist, blurry vision, red/open/draining incision, heavy vaginal bleeding, pain that does not improve with medication. No heavy lifting, nothing in the vagina, no submerging your bottom in water.

## 2022-11-16 NOTE — PROGRESS NOTE ADULT - ATTENDING COMMENTS
pt seen and examined. agree with above.  POD #1, s/p CD for prior myomectomy, overall doing well. no c/o sx anemia.  VSS  Inc: c/d/i  FF and below umb                        7.4    11.28 )-----------( 134      ( 15 Nov 2022 07:29 )             22.1     pain controlled with po pain meds prn.  acute blood loss anemia, for daily iron/vit C. monitor sx.  ambulation encouraged.  baby well, breast/bottle feeding  possible d/c home tomorrow
pt seen and examined. overall doing well. baby in nicu for hypoglycemia but likely to be discharged from nicu today. pt meeting milestones. stable for dc home today. reviewed pp irvin.     george ospina

## 2022-11-16 NOTE — DISCHARGE NOTE OB - HOSPITAL COURSE
patient underwent an uncomplicated  delivery. uncomplicated postpartum course. stable to dc home on pod2.

## 2022-11-16 NOTE — DISCHARGE NOTE OB - CARE PLAN
1 Principal Discharge DX:	 delivery delivered  Assessment and plan of treatment:	Call your doctor for fevers, chills, nausea, vomiting, headaches that persist, blurry vision, red/open/draining incision, heavy vaginal bleeding, pain that does not improve with medication. No heavy lifting, nothing in the vagina, no submerging your bottom in water.

## 2022-11-16 NOTE — DISCHARGE NOTE OB - CARE PROVIDER_API CALL
Bar Madrigal)  Obstetrics and Gynecology  [address] [place] [address], [address]  [place] [address]  Phone: [phone]  Fax: [phone]  Follow Up Time: 2 weeks

## 2022-11-16 NOTE — PROGRESS NOTE ADULT - SUBJECTIVE AND OBJECTIVE BOX
Day 1 of Anesthesia Pain Management Service    SUBJECTIVE:  Pain Scale Score:          [X] Refer to charted pain scores    THERAPY: Received PF spinal morphine as above    OBJECTIVE:    Sedation:        	[X] Alert	[ ] Drowsy	[ ] Arousable      [ ] Asleep       [ ] Unresponsive    Side Effects:	[X] None	[ ] Nausea	[ ] Vomiting         [ ] Pruritus  		[ ] Weakness            [ ] Numbness	          [ ] Other:    ASSESSMENT/ PLAN  [X] Patient transitioned to prn analgesics  [X] Pain management per primary service, pain service to sign off   [X]Documentation and Verification of current medications
Day 1 of Anesthesia Pain Management Service    SUBJECTIVE: Doing ok  Pain Scale Score:          [X] Refer to charted pain scores    THERAPY:    s/p   100 mcg PF morphine on 11\14\2022      MEDICATIONS  (STANDING):  acetaminophen     Tablet .. 975 milliGRAM(s) Oral <User Schedule>  diphtheria/tetanus/pertussis (acellular) Vaccine (Adacel) 0.5 milliLiter(s) IntraMuscular once  heparin   Injectable 5000 Unit(s) SubCutaneous every 12 hours  ibuprofen  Tablet. 600 milliGRAM(s) Oral every 6 hours  influenza   Vaccine 0.5 milliLiter(s) IntraMuscular once  ketorolac   Injectable 30 milliGRAM(s) IV Push every 6 hours  lactated ringers. 1000 milliLiter(s) (125 mL/Hr) IV Continuous <Continuous>  morphine PF Spinal 0.1 milliGRAM(s) IntraThecal. once  oxytocin Infusion 333.333 milliUNIT(s)/Min (1000 mL/Hr) IV Continuous <Continuous>  oxytocin Infusion 333.333 milliUNIT(s)/Min (1000 mL/Hr) IV Continuous <Continuous>    MEDICATIONS  (PRN):  dexAMETHasone  Injectable 4 milliGRAM(s) IV Push every 6 hours PRN Nausea  diphenhydrAMINE 25 milliGRAM(s) Oral every 6 hours PRN Pruritus  lanolin Ointment 1 Application(s) Topical every 6 hours PRN Sore Nipples  magnesium hydroxide Suspension 30 milliLiter(s) Oral two times a day PRN Constipation  naloxone Injectable 0.1 milliGRAM(s) IV Push every 3 minutes PRN For ANY of the following changes in patient status:  A. Breaths Per Minute LESS THAN 10, B. Oxygen saturation LESS THAN 90%, C. Sedation score of 6 for Stop After: 4 Times  ondansetron Injectable 4 milliGRAM(s) IV Push every 6 hours PRN Nausea  oxyCODONE    IR 5 milliGRAM(s) Oral every 3 hours PRN Moderate to Severe Pain (4-10)  oxyCODONE    IR 5 milliGRAM(s) Oral once PRN Moderate to Severe Pain (4-10)  simethicone 80 milliGRAM(s) Chew every 4 hours PRN Gas      OBJECTIVE:    Sedation:        	[X] Alert  	[ ] Drowsy	[ ] Arousable      [ ] Asleep       [ ] Unresponsive    Side Effects:	[X] None 	[ ] Nausea	[ ] Vomiting         [ ] Pruritus  		[ ] Weakness            [ ] Numbness	          [ ] Other:    Vital Signs Last 24 Hrs  T(C): 37.1 (15 Nov 2022 06:39), Max: 37.2 (15 Nov 2022 00:43)  T(F): 98.8 (15 Nov 2022 06:39), Max: 98.9 (15 Nov 2022 00:43)  HR: 66 (15 Nov 2022 06:39) (54 - 72)  BP: 108/67 (15 Nov 2022 06:39) (108/67 - 134/78)  BP(mean): 86 (14 Nov 2022 14:55) (85 - 91)  RR: 18 (15 Nov 2022 06:39) (17 - 35)  SpO2: 98% (15 Nov 2022 06:39) (96% - 100%)    Parameters below as of 15 Nov 2022 06:39  Patient On (Oxygen Delivery Method): room air        ASSESSMENT/ PLAN  [X] Patient to be transitioned to prn analgesics later today  [X] Pain management per primary service, pain service to sign off   [X]Documentation and Verification of current medications
OB Progress Note:  Delivery, POD#1    S: Patient seen at bedside. Pain well controlled, tolerating regular diet, passing flatus, ambulating without difficulty, not yet voiding spontaneously. Denies heavy vaginal bleeding, N/V, CP/SOB/lightheadedness/dizziness, headache, visual disturbances, epigastric pain.     O:   Vital Signs Last 24 Hrs  T(C): 37.1 (15 Nov 2022 06:39), Max: 37.2 (15 Nov 2022 00:43)  T(F): 98.8 (15 Nov 2022 06:39), Max: 98.9 (15 Nov 2022 00:43)  HR: 66 (15 Nov 2022 06:39) (54 - 80)  BP: 108/67 (15 Nov 2022 06:39) (108/67 - 134/78)  BP(mean): 86 (2022 14:55) (85 - 91)  RR: 18 (15 Nov 2022 06:39) (17 - 35)  SpO2: 98% (15 Nov 2022 06:39) (96% - 100%)    Parameters below as of 15 Nov 2022 06:39  Patient On (Oxygen Delivery Method): room air        Labs:  Blood type: O Negative  Rubella IgG: RPR: Negative      PE:  General: NAD  Abdomen: Mildly distended, appropriately tender, Fundus firm, incision c/d/i.  VE: No heavy vaginal bleeding  Extremities: No erythema, no pitting edema
OB Progress Note: LTCS, POD#2    S: Patient seen at bedside. Pain well controlled, tolerating regular diet, passing faltus, voiding spontaneously, ambulating without difficulty. Denies heavy vaginal bleeding, CP/SOB, lightheadedness/dizziness, nausea/vomiting, headache, visual disturbances, epigastric pain.     O:  Vitals:  Vital Signs Last 24 Hrs  T(C): 36.4 (16 Nov 2022 06:25), Max: 36.8 (15 Nov 2022 09:55)  T(F): 97.5 (16 Nov 2022 06:25), Max: 98.3 (15 Nov 2022 09:55)  HR: 63 (16 Nov 2022 06:25) (63 - 72)  BP: 125/79 (16 Nov 2022 06:25) (108/63 - 125/79)  BP(mean): --  RR: 18 (16 Nov 2022 06:25) (18 - 18)  SpO2: 98% (16 Nov 2022 06:25) (95% - 98%)    Parameters below as of 16 Nov 2022 06:25  Patient On (Oxygen Delivery Method): room air        MEDICATIONS  (STANDING):  acetaminophen     Tablet .. 975 milliGRAM(s) Oral <User Schedule>  diphtheria/tetanus/pertussis (acellular) Vaccine (Adacel) 0.5 milliLiter(s) IntraMuscular once  heparin   Injectable 5000 Unit(s) SubCutaneous every 12 hours  ibuprofen  Tablet. 600 milliGRAM(s) Oral every 6 hours  influenza   Vaccine 0.5 milliLiter(s) IntraMuscular once  lactated ringers. 1000 milliLiter(s) (125 mL/Hr) IV Continuous <Continuous>  oxytocin Infusion 333.333 milliUNIT(s)/Min (1000 mL/Hr) IV Continuous <Continuous>  oxytocin Infusion 333.333 milliUNIT(s)/Min (1000 mL/Hr) IV Continuous <Continuous>      MEDICATIONS  (PRN):  diphenhydrAMINE 25 milliGRAM(s) Oral every 6 hours PRN Pruritus  lanolin Ointment 1 Application(s) Topical every 6 hours PRN Sore Nipples  magnesium hydroxide Suspension 30 milliLiter(s) Oral two times a day PRN Constipation  oxyCODONE    IR 5 milliGRAM(s) Oral every 3 hours PRN Moderate to Severe Pain (4-10)  oxyCODONE    IR 5 milliGRAM(s) Oral once PRN Moderate to Severe Pain (4-10)  simethicone 80 milliGRAM(s) Chew every 4 hours PRN Gas      Labs:  Blood type: O Negative  Rubella IgG: RPR: Negative                          7.4<L>   11.28<H> >-----------< 134<L>    ( 11-15 @ 07:29 )             22.1<L>    PE:  General: NAD  Abdomen: Soft, appropriately tender, Fundus firm incision c/d/i.  VE: No heavy vaginal bleeding  Extremities: No erythema, no pitting edema
OB Progress Note: LTCS, POD#3    S: Patient seen at bedside. Pain well controlled, tolerating regular diet, passing faltus, voiding spontaneously, ambulating without difficulty. Denies heavy vaginal bleeding, CP/SOB, lightheadedness/dizziness, nausea/vomiting, headache, visual disturbances, epigastric pain.     O:  Vital Signs Last 24 Hrs  T(C): 36.4 (16 Nov 2022 06:25), Max: 36.8 (15 Nov 2022 09:55)  T(F): 97.5 (16 Nov 2022 06:25), Max: 98.3 (15 Nov 2022 09:55)  HR: 63 (16 Nov 2022 06:25) (63 - 72)  BP: 125/79 (16 Nov 2022 06:25) (108/63 - 125/79)  RR: 18 (16 Nov 2022 06:25) (18 - 18)  SpO2: 98% (16 Nov 2022 06:25) (95% - 98%)    Parameters below as of 16 Nov 2022 06:25  Patient On (Oxygen Delivery Method): room air    MEDICATIONS  (STANDING):  acetaminophen     Tablet .. 975 milliGRAM(s) Oral <User Schedule>  diphtheria/tetanus/pertussis (acellular) Vaccine (Adacel) 0.5 milliLiter(s) IntraMuscular once  heparin   Injectable 5000 Unit(s) SubCutaneous every 12 hours  ibuprofen  Tablet. 600 milliGRAM(s) Oral every 6 hours  influenza   Vaccine 0.5 milliLiter(s) IntraMuscular once  lactated ringers. 1000 milliLiter(s) (125 mL/Hr) IV Continuous <Continuous>  oxytocin Infusion 333.333 milliUNIT(s)/Min (1000 mL/Hr) IV Continuous <Continuous>  oxytocin Infusion 333.333 milliUNIT(s)/Min (1000 mL/Hr) IV Continuous <Continuous>    MEDICATIONS  (PRN):  diphenhydrAMINE 25 milliGRAM(s) Oral every 6 hours PRN Pruritus  lanolin Ointment 1 Application(s) Topical every 6 hours PRN Sore Nipples  magnesium hydroxide Suspension 30 milliLiter(s) Oral two times a day PRN Constipation  oxyCODONE    IR 5 milliGRAM(s) Oral every 3 hours PRN Moderate to Severe Pain (4-10)  oxyCODONE    IR 5 milliGRAM(s) Oral once PRN Moderate to Severe Pain (4-10)  simethicone 80 milliGRAM(s) Chew every 4 hours PRN Gas    Labs:  Blood type: O Negative  Rubella IgG: RPR: Negative                          7.4<L>   11.28<H> >-----------< 134<L>    ( 11-15 @ 07:29 )             22.1<L>    PE:  General: NAD  Abdomen: Soft, appropriately tender, Fundus firm incision c/d/i with sterri strips in place  VE: No heavy vaginal bleeding  Extremities: No erythema, no pitting edema

## 2022-11-16 NOTE — DISCHARGE NOTE OB - MEDICATION SUMMARY - MEDICATIONS TO TAKE
I will START or STAY ON the medications listed below when I get home from the hospital:    acetaminophen 325 mg oral tablet  -- 3 tab(s) by mouth every 6 hours  -- Indication: For  delivery delivered    ibuprofen 600 mg oral tablet  -- 1 tab(s) by mouth every 6 hours  -- Indication: For  delivery delivered    oxyCODONE 5 mg oral tablet  -- 1 tab(s) by mouth every 6 hours MDD:4  -- Indication: For  delivery delivered    Prenatal Multivitamins oral tablet  -- 1 tab(s) by mouth once a day  -- Indication: For  delivery delivered    simethicone 80 mg oral tablet, chewable  -- 1 tab(s) by mouth every 4 hours, As needed, Gas  -- Indication: For  delivery delivered

## 2022-11-16 NOTE — DISCHARGE NOTE OB - PATIENT PORTAL LINK FT
You can access the FollowMyHealth Patient Portal offered by [facility] by registering at the following website: [email or web address]. By joining [facility]’s FollowMyHealth portal, you will also be able to view your health information using other applications (apps) compatible with our system.

## 2022-11-16 NOTE — PROGRESS NOTE ADULT - ASSESSMENT
A/P: 43yo POD#2 s/p scheduled pLTCS due to previous myomectomy. QBL 1505cc. Patient is stable and doing well post-operatively.    - Continue regular diet  - Increase ambulation  - Continue motrin, tylenol, oxycodone PRN for pain control.    - POD#1 AM CBC with H/H: 11.1/32.5 -> 7.4/22.1    Noni Rangel, PGY1
A/P: 41yo POD#3 s/p scheduled pLTCS due to previous myomectomy. QBL 1505cc. Patient is stable and doing well post-operatively.    - Continue regular diet  - Increase ambulation  - Continue motrin, tylenol, oxycodone PRN for pain control.    - POD#1 AM CBC with H/H: 11.1/32.5 -> 7.4/22.1  - Discharge planning per private attending    Zulema Bejarano PGY1
A/P: 41yo POD#1 s/p scheduled pLTCS due to previous myomectomy.  Patient is stable and doing well post-operatively.    - Continue regular diet  - Increase ambulation  - Continue motrin, tylenol, oxycodone PRN for pain control.    - F/u void trial, not yet 8 hours since yen removal  - f/u AM CBC    Noni Rangel, PGY1

## 2022-11-30 ENCOUNTER — APPOINTMENT (OUTPATIENT)
Dept: OBGYN | Facility: CLINIC | Age: 42
End: 2022-11-30

## 2022-11-30 VITALS
DIASTOLIC BLOOD PRESSURE: 60 MMHG | SYSTOLIC BLOOD PRESSURE: 94 MMHG | HEIGHT: 68 IN | BODY MASS INDEX: 24.25 KG/M2 | WEIGHT: 160 LBS

## 2022-11-30 PROCEDURE — 0503F POSTPARTUM CARE VISIT: CPT

## 2022-12-05 NOTE — HISTORY OF PRESENT ILLNESS
[Delivery Date: ___] : on [unfilled] [Primary C/S] : delivered by  section [Female] : Delivery History: baby girl [Wt. ___] : weighing [unfilled] [Breastfeeding] : currently nursing [FreeTextEntry8] : JS [FreeTextEntry1] : presents for a Post partum visit - initial visit \par Delivery details: 22 girl\par Delivery complications:none\par \par Current symptoms and complaints: normal bladder function, normal bowel, no PPD. The patient is currently asymptomatic. No associated symptoms are reported. \par \par Infant feeding:\par -breast vs bottle\par -pumping vs formula\par  \par \par Exam:\par -Incision well healed, C/D/I\par -No abdominal pain or tenderness, perinea; normal/ well healed\par L/E:\par -Warm and well perfused\par \par Assessment: \par -s/p \par \par Plan:\par -Inter pregnancy interval discussed\par -contraception options discussed\par -mastitis precautions reviewed\par -pelvic rest\par -post partum clearance\par -RTO 4 weeks for full post partum visit \par -RTO 3-4 months for annual\par

## 2022-12-14 ENCOUNTER — NON-APPOINTMENT (OUTPATIENT)
Age: 42
End: 2022-12-14

## 2023-04-30 ENCOUNTER — NON-APPOINTMENT (OUTPATIENT)
Age: 43
End: 2023-04-30

## 2024-05-03 NOTE — PRE-ANESTHESIA EVALUATION ADULT - NSATTENDATTESTRD_GEN_ALL_CORE
Assessment & Plan     1. Class 1 obesity due to excess calories without serious comorbidity with body mass index (BMI) of 34.0 to 34.9 in adult  Increase mounjaro  Continue watching diet  Increase exercise.   - tirzepatide (MOUNJARO) 7.5 MG/0.5ML pen; Inject 7.5 mg Subcutaneous every 7 days  Dispense: 6 mL; Refill: 1  - Lipid Profile; Future  - Comprehensive metabolic panel; Future  - TSH with free T4 reflex; Future    2. Prediabetes  - tirzepatide (MOUNJARO) 7.5 MG/0.5ML pen; Inject 7.5 mg Subcutaneous every 7 days  Dispense: 6 mL; Refill: 1  - Hemoglobin A1c; Future    Follow-up in 3 months or as needed    Mary Ellen Armstrong,   Certified Adult Nurse Practitioner  [phone]       The patient has been re-examined and I agree with the above assessment or I updated with my findings.

## 2024-06-11 NOTE — H&P PST ADULT - BP NONINVASIVE MEAN (MM HG)
CHIEF COMPLAINT:  Chief Complaint   Patient presents with   • Diarrhea     x4 days with nausea   • Vaginal Problem     \"Red lines\"       HPI:  Annemarie Colon is a pleasant 39 year old female, patient of Sonya Shah APNP , who is here today for an acute  visit for:      Depression/Anxiety:  Annemarie is not currently prescribed medication for depression or anxiety.  She was recently weaned off venlafaxine, which took 'awhile.'  She has been off venlafaxine for two months and Since the last visit the symptoms are not improving  Current stress factors in life include: started a new job in May and working from home and also daughters graduation.  She is unsure if it was a lot at one time.  Does not have harming thoughts.  She has previously been prescribed lexapro 'a long time ago' and 'tolerated it well' and would like to try again.  Mother is on lexapro and offered same advice. Is considering therapy back with Claritza Huitron who she previously had and is now in [place], [place] and works for [employer].  Annemarie denies suicidal ideation, change in appetite, sleep disturbances.       Diarrhea:  Onset 4 days ago of diarrhea with associating nausea.  She notes over the last three days after she eats something 'I am in the bathroom.'  Has changed diet to bland and has been drinking 7-UP.  Has been getting nauseous.  Had truxima last week. Cannot identify insulting incident.  Is loose, not watery, 'not complete liquid.'  Has constant stomach ache.  Denies bloody stools or vomiting blood.  Estimates she has a 'more than 3 or 4' and notes, 'I can't even count.'          REVIEW OF SYSTEMS:  Constitutional:  Denies fatigue, fever or chills, unusual weight changes   Eyes:  Denies change in visual acuity   HENT:  Denies headache, nasal congestion or sore throat   Respiratory:  Denies cough, shortness of breath or wheezing  Cardiovascular:  Denies chest pain, palpitations or Will have intermittent peripheral edema which has  previously been prescribed furosemide, she is requesting refill.  Denies side effects with furosemide use.  Notes with warm weather will swell up.  GI:  abdominal pain, nausea, vomiting, diarrhea Denies  bloody stools or   :  Denies dysuria.  Has red marks on outside of labia that will come and go and today red marks are there, but there is no pain. At times there is tender to touch, notes the tenderness is random and not associating with period.  Her menstrual cycle is regular and has not had intercourse in over a year.   Integument:  Denies rash   Neurologic:  Denies headache, focal weakness or sensory changes   Lymphatic:  Denies swollen glands   Psychiatric: see HPI      I have reviewed the patient's medications and allergies, past medical, surgical, social and family history, updating these as appropriate.       OBJECTIVE:   Visit Vitals  /82 (BP Location: RUE - Right upper extremity, Cuff Size: Large Adult)   Pulse 64   Ht 5' 8\" (1.727 m)   Wt 115.2 kg (254 lb)   LMP 05/27/2024 (Approximate)   SpO2 98%   BMI 38.62 kg/m²         PHYSICAL EXAM:  Constitutional:  no acute distress, non-toxic appearance   Eyes:  PERRL, conjunctiva normal   Respiratory:  Bilateral lung fields clear to auscultation with adequate ventilatory effort.  No wheezes, rales or rhonchi.  No accessory muscle use.  Cardiovascular:  Regular rate and rhythm.  S1 and S2 appreciated. No murmur, rubs or gallops.  GI:  Soft, non-tender, non-distended with normoactive bowel sounds in all quadrants.    No hepatosplenomegaly. No masses or rashes. No rebound tenderness or guarding.  No abdominal bruit noted.   :  No costovertebral angle tenderness. External genitalia evaluated, labia with redness and there are two linear lesions with shallow ulceration extending from clitoris to suprapubic.  Chaperone present during sensitive exam: No, patient declined chaperone.                                                                      Integument:   Well hydrated, no rash.   Neurologic:  Alert & oriented x 3, Person, place and time.   Psychiatric:  Speech and behavior appropriate      LABS:  No visits with results within 1 Week(s) from this visit.   Latest known visit with results is:   Lab Services on 06/03/2024   Component Date Value   • Cholesterol 06/03/2024 224 (H)    • Triglycerides 06/03/2024 102    • HDL 06/03/2024 50    • LDL 06/03/2024 154 (H)    • Non-HDL Cholesterol 06/03/2024 174    • Cholesterol/ HDL Ratio 06/03/2024 4.5 (H)    • Sodium 06/03/2024 139    • Potassium 06/03/2024 4.1    • Chloride 06/03/2024 105    • Carbon Dioxide 06/03/2024 23    • Anion Gap 06/03/2024 15    • Glucose 06/03/2024 101 (H)    • BUN 06/03/2024 12    • Creatinine 06/03/2024 0.86    • Glomerular Filtration Ra* 06/03/2024 88    • BUN/Cr 06/03/2024 14    • Calcium 06/03/2024 8.8    • Bilirubin, Total 06/03/2024 0.6    • GOT/AST 06/03/2024 21    • GPT/ALT 06/03/2024 26    • Alkaline Phosphatase 06/03/2024 53    • Albumin 06/03/2024 3.3 (L)    • Protein, Total 06/03/2024 6.8    • Globulin 06/03/2024 3.5    • A/G Ratio 06/03/2024 0.9 (L)    • WBC 06/03/2024 4.3    • RBC 06/03/2024 4.23    • HGB 06/03/2024 12.3    • HCT 06/03/2024 37.2    • MCV 06/03/2024 87.9    • MCH 06/03/2024 29.1    • MCHC 06/03/2024 33.1    • RDW-CV 06/03/2024 13.3    • RDW-SD 06/03/2024 42.8    • PLT 06/03/2024 270    • NRBC 06/03/2024 0    • Neutrophil, Percent 06/03/2024 81    • Lymphocytes, Percent 06/03/2024 8    • Mono, Percent 06/03/2024 8    • Eosinophils, Percent 06/03/2024 2    • Basophils, Percent 06/03/2024 1    • Immature Granulocytes 06/03/2024 0    • Absolute Neutrophils 06/03/2024 3.5    • Absolute Lymphocytes 06/03/2024 0.4 (L)    • Absolute Monocytes 06/03/2024 0.3    • Absolute Eosinophils  06/03/2024 0.1    • Absolute Basophils 06/03/2024 0.0    • Absolute Immature Granul* 06/03/2024 0.0          ASSESSMENT AND PLAN:   1. Depression, major, recurrent, mild (CMD)  2. NYLA (generalized  anxiety disorder)  3. Situational anxiety  Uncontrolled, after discussion will start escitalopram 10 mg daily.  Advised on medication use, side effects and expected results.  Will continue as needed clonazepam as well.  Last filled in 02/2024.  F/u 1 month for re evaluation of symptoms.  - escitalopram (LEXAPRO) 10 MG tablet; Take 1 tablet by mouth daily.  Dispense: 30 tablet; Refill: 1  - clonazePAM (KlonoPIN) 1 MG tablet; Take half to 1 tablet orally once daily as needed for anxiety.  Dispense: 30 tablet; Refill: 0    4. Diarrhea of presumed infectious origin  Will obtain GI pathogen panel considering received truxima recently and vulnerable to infection.  Pending results.  - Gastrointestinal Pathogen Panel; Future  - WBC Stool; Future    5. Yeast vaginitis  - nystatin (MYCOSTATIN) 594288 UNIT/GM cream; Apply 1 Application topically in the morning and 1 Application in the evening.  Dispense: 30 g; Refill: 1    6. Labial lesion  - mupirocin (BACTROBAN) 2 % ointment; Apply topically 3 times daily.  Dispense: 22 g; Refill: 1    7. Peripheral edema  Avoid nsaid use, ensure adequate intake of dietary potassium.  Advised on medication use, side effects and expected results.  - furosemide (LASIX) 20 MG tablet; TAKE 1 TABLET DAILY AS NEEDED FOR LEG SWELLING  Dispense: 90 tablet; Refill: 1      Return in about 1 month (around 7/11/2024) for depression/anxiety f/u.      Total time spent for this encounter was 36 minutes, including time spent on preparation, examination, coordination and delivery of direct care, counseling, and documentation.      HUSSEIN Weber  [address] [address]  [phone]  [phone]       Dr. Ulysses Boco serves as supervising physician           In the interest of transparency, the 21st Century Cures Act requires healthcare organizations to make nearly all medical information readily available to patients.  Please remember that this document is intended as a form of “[id number]” communication. Often medical records contain verbiage and abbreviations that might be unfamiliar and without context. Language may appear direct as it is intended to succinctly relay the clinical opinion of the practitioner.           93

## 2024-10-10 ENCOUNTER — NON-APPOINTMENT (OUTPATIENT)
Age: 44
End: 2024-10-10
